# Patient Record
Sex: FEMALE | Race: WHITE | NOT HISPANIC OR LATINO | Employment: OTHER | URBAN - METROPOLITAN AREA
[De-identification: names, ages, dates, MRNs, and addresses within clinical notes are randomized per-mention and may not be internally consistent; named-entity substitution may affect disease eponyms.]

---

## 2017-07-24 ENCOUNTER — NON-PROVIDER VISIT (OUTPATIENT)
Dept: URGENT CARE | Facility: CLINIC | Age: 82
End: 2017-07-24

## 2017-07-24 DIAGNOSIS — Z11.1 ENCOUNTER FOR PPD TEST: ICD-10-CM

## 2017-07-24 PROCEDURE — 86580 TB INTRADERMAL TEST: CPT | Performed by: PHYSICIAN ASSISTANT

## 2017-07-27 ENCOUNTER — NON-PROVIDER VISIT (OUTPATIENT)
Dept: URGENT CARE | Facility: CLINIC | Age: 82
End: 2017-07-27

## 2017-07-27 DIAGNOSIS — Z11.1 PPD SCREENING TEST: ICD-10-CM

## 2017-07-27 LAB — TB WHEAL 3D P 5 TU DIAM: NORMAL MM

## 2017-12-28 ENCOUNTER — APPOINTMENT (RX ONLY)
Dept: URBAN - METROPOLITAN AREA CLINIC 20 | Facility: CLINIC | Age: 82
Setting detail: DERMATOLOGY
End: 2017-12-28

## 2017-12-28 DIAGNOSIS — L72.0 EPIDERMAL CYST: ICD-10-CM

## 2017-12-28 DIAGNOSIS — L82.1 OTHER SEBORRHEIC KERATOSIS: ICD-10-CM

## 2017-12-28 DIAGNOSIS — L81.4 OTHER MELANIN HYPERPIGMENTATION: ICD-10-CM

## 2017-12-28 DIAGNOSIS — L57.0 ACTINIC KERATOSIS: ICD-10-CM

## 2017-12-28 DIAGNOSIS — L85.3 XEROSIS CUTIS: ICD-10-CM

## 2017-12-28 PROBLEM — I10 ESSENTIAL (PRIMARY) HYPERTENSION: Status: ACTIVE | Noted: 2017-12-28

## 2017-12-28 PROCEDURE — ? LIQUID NITROGEN

## 2017-12-28 PROCEDURE — 17003 DESTRUCT PREMALG LES 2-14: CPT

## 2017-12-28 PROCEDURE — 99202 OFFICE O/P NEW SF 15 MIN: CPT | Mod: 25

## 2017-12-28 PROCEDURE — 17000 DESTRUCT PREMALG LESION: CPT

## 2017-12-28 PROCEDURE — ? COUNSELING

## 2017-12-28 ASSESSMENT — LOCATION SIMPLE DESCRIPTION DERM
LOCATION SIMPLE: INFERIOR FOREHEAD
LOCATION SIMPLE: NOSE
LOCATION SIMPLE: LEFT FOREHEAD
LOCATION SIMPLE: LEFT NOSE
LOCATION SIMPLE: LEFT CHEEK
LOCATION SIMPLE: RIGHT SUBMANDIBULAR AREA

## 2017-12-28 ASSESSMENT — LOCATION ZONE DERM
LOCATION ZONE: NOSE
LOCATION ZONE: FACE

## 2017-12-28 ASSESSMENT — LOCATION DETAILED DESCRIPTION DERM
LOCATION DETAILED: LEFT NASAL SIDEWALL
LOCATION DETAILED: LEFT MEDIAL MALAR CHEEK
LOCATION DETAILED: INFERIOR MID FOREHEAD
LOCATION DETAILED: NASAL DORSUM
LOCATION DETAILED: LEFT MEDIAL FOREHEAD
LOCATION DETAILED: RIGHT SUBMANDIBULAR AREA

## 2017-12-28 NOTE — PROCEDURE: LIQUID NITROGEN
Post-Care Instructions: I reviewed with the patient in detail post-care instructions. Patient is to wear sun protection and avoid picking at any of the treated lesions. Pt may apply Vaseline to crusted or scabbing areas.
Render Post-Care Instructions In Note?: yes
Duration Of Freeze Thaw-Cycle (Seconds): 0
Detail Level: Detailed
Consent: The patient's consent was obtained including but not limited to risks of crusting, scabbing, blistering, scarring, darker or lighter pigmentary change, recurrence, incomplete removal and infection.

## 2018-02-01 ENCOUNTER — APPOINTMENT (RX ONLY)
Dept: URBAN - METROPOLITAN AREA CLINIC 20 | Facility: CLINIC | Age: 83
Setting detail: DERMATOLOGY
End: 2018-02-01

## 2018-02-02 ENCOUNTER — APPOINTMENT (RX ONLY)
Dept: URBAN - METROPOLITAN AREA CLINIC 20 | Facility: CLINIC | Age: 83
Setting detail: DERMATOLOGY
End: 2018-02-02

## 2018-02-02 DIAGNOSIS — L57.0 ACTINIC KERATOSIS: ICD-10-CM | Status: IMPROVED

## 2018-02-02 DIAGNOSIS — L82.0 INFLAMED SEBORRHEIC KERATOSIS: ICD-10-CM

## 2018-02-02 DIAGNOSIS — L72.0 EPIDERMAL CYST: ICD-10-CM

## 2018-02-02 DIAGNOSIS — Z12.83 ENCOUNTER FOR SCREENING FOR MALIGNANT NEOPLASM OF SKIN: ICD-10-CM

## 2018-02-02 DIAGNOSIS — L81.4 OTHER MELANIN HYPERPIGMENTATION: ICD-10-CM

## 2018-02-02 DIAGNOSIS — Z85.828 PERSONAL HISTORY OF OTHER MALIGNANT NEOPLASM OF SKIN: ICD-10-CM

## 2018-02-02 DIAGNOSIS — L82.1 OTHER SEBORRHEIC KERATOSIS: ICD-10-CM

## 2018-02-02 DIAGNOSIS — D18.0 HEMANGIOMA: ICD-10-CM

## 2018-02-02 PROBLEM — D18.01 HEMANGIOMA OF SKIN AND SUBCUTANEOUS TISSUE: Status: ACTIVE | Noted: 2018-02-02

## 2018-02-02 PROCEDURE — 17003 DESTRUCT PREMALG LES 2-14: CPT

## 2018-02-02 PROCEDURE — 17110 DESTRUCTION B9 LES UP TO 14: CPT

## 2018-02-02 PROCEDURE — 17000 DESTRUCT PREMALG LESION: CPT | Mod: 59

## 2018-02-02 PROCEDURE — 99214 OFFICE O/P EST MOD 30 MIN: CPT | Mod: 25

## 2018-02-02 PROCEDURE — ? COUNSELING

## 2018-02-02 PROCEDURE — ? LIQUID NITROGEN

## 2018-02-02 ASSESSMENT — LOCATION SIMPLE DESCRIPTION DERM
LOCATION SIMPLE: LEFT CHEEK
LOCATION SIMPLE: CHEST
LOCATION SIMPLE: LEFT ANTERIOR NECK
LOCATION SIMPLE: RIGHT ZYGOMA
LOCATION SIMPLE: RIGHT EYEBROW
LOCATION SIMPLE: LEFT SCALP
LOCATION SIMPLE: RIGHT HAND

## 2018-02-02 ASSESSMENT — LOCATION DETAILED DESCRIPTION DERM
LOCATION DETAILED: UPPER STERNUM
LOCATION DETAILED: LEFT INFERIOR ANTERIOR NECK
LOCATION DETAILED: RIGHT ULNAR DORSAL HAND
LOCATION DETAILED: RIGHT CENTRAL ZYGOMA
LOCATION DETAILED: LEFT CLAVICULAR NECK
LOCATION DETAILED: RIGHT LATERAL EYEBROW
LOCATION DETAILED: LEFT INFERIOR LATERAL NECK
LOCATION DETAILED: LEFT CENTRAL FRONTAL SCALP
LOCATION DETAILED: LEFT MEDIAL MALAR CHEEK

## 2018-02-02 ASSESSMENT — LOCATION ZONE DERM
LOCATION ZONE: NECK
LOCATION ZONE: TRUNK
LOCATION ZONE: HAND
LOCATION ZONE: FACE
LOCATION ZONE: SCALP

## 2018-02-02 NOTE — PROCEDURE: LIQUID NITROGEN
Duration Of Freeze Thaw-Cycle (Seconds): 0
Detail Level: Detailed
Render Post-Care Instructions In Note?: yes
Post-Care Instructions: I reviewed with the patient in detail post-care instructions. Patient is to wear sun protection and avoid picking at any of the treated lesions. Pt may apply Vaseline to crusted or scabbing areas.
Consent: The patient's consent was obtained including but not limited to risks of crusting, scabbing, blistering, scarring, darker or lighter pigmentary change, recurrence, incomplete removal and infection.
Add 52 Modifier (Optional): no
Medical Necessity Information: It is in your best interest to select a reason for this procedure from the list below. All of these items fulfill various CMS LCD requirements except the new and changing color options.
Post-Care Instructions: I reviewed with the patient in detail post-care instructions. Patient is to avoid picking at any of the treated lesions. Pt may apply Vaseline to crusted or scabbing areas.
Number Of Freeze-Thaw Cycles: 3 freeze-thaw cycles
Medical Necessity Clause: This procedure was medically necessary because the lesions that were treated were: changing color

## 2018-07-11 ENCOUNTER — NON-PROVIDER VISIT (OUTPATIENT)
Dept: URGENT CARE | Facility: CLINIC | Age: 83
End: 2018-07-11

## 2018-07-11 DIAGNOSIS — Z11.1 ENCOUNTER FOR PPD TEST: ICD-10-CM

## 2018-07-11 PROCEDURE — 86580 TB INTRADERMAL TEST: CPT | Performed by: PHYSICIAN ASSISTANT

## 2018-07-11 NOTE — NON-PROVIDER
Felicity Dominguez is a 89 y.o. female here for a non-provider visit for PPD placement -- Step 1 of 1    Reason for PPD:  nursing home requirement    1. TB evaluation questionnaire completed by patient? Yes      -  If any answers marked yes did you contact a provider prior to placing? Not Indicated  2.  Patient notified to return to clinic for reading on: 7/13/18 to 7/14/18  3.  PPD Placement documentation completed on TB evaluation questionnaire? Yes  4.  Location of TB evaluation questionnaire filed:

## 2018-07-13 ENCOUNTER — NON-PROVIDER VISIT (OUTPATIENT)
Dept: URGENT CARE | Facility: CLINIC | Age: 83
End: 2018-07-13

## 2018-07-13 LAB — TB WHEAL 3D P 5 TU DIAM: NORMAL MM

## 2018-07-13 NOTE — NON-PROVIDER
Felicity Dominguez is a 89 y.o. female here for a non-provider visit for PPD reading -- Step 1 of 2.      1.  Resulted in Epic under enter/edit results? Yes   2.  TB evaluation questionnaire scanned into chart and original given to patient?Yes     3. Was induration greater than 0 mm? No.    If Step 1 of 2, when is patient returning for second step (delete if N/A):     Routed to PCP? No

## 2018-09-26 ENCOUNTER — APPOINTMENT (RX ONLY)
Dept: URBAN - METROPOLITAN AREA CLINIC 4 | Facility: CLINIC | Age: 83
Setting detail: DERMATOLOGY
End: 2018-09-26

## 2018-09-26 DIAGNOSIS — L57.0 ACTINIC KERATOSIS: ICD-10-CM

## 2018-09-26 DIAGNOSIS — L72.8 OTHER FOLLICULAR CYSTS OF THE SKIN AND SUBCUTANEOUS TISSUE: ICD-10-CM

## 2018-09-26 PROCEDURE — 17000 DESTRUCT PREMALG LESION: CPT

## 2018-09-26 PROCEDURE — ? LIQUID NITROGEN

## 2018-09-26 PROCEDURE — ? COUNSELING

## 2018-09-26 PROCEDURE — ? OBSERVATION AND MEASURE

## 2018-09-26 PROCEDURE — 99212 OFFICE O/P EST SF 10 MIN: CPT | Mod: 25

## 2018-09-26 PROCEDURE — ? ADDITIONAL NOTES

## 2018-09-26 ASSESSMENT — LOCATION SIMPLE DESCRIPTION DERM
LOCATION SIMPLE: RIGHT ANTERIOR NECK
LOCATION SIMPLE: LEFT CHEEK

## 2018-09-26 ASSESSMENT — LOCATION DETAILED DESCRIPTION DERM
LOCATION DETAILED: RIGHT INFERIOR ANTERIOR NECK
LOCATION DETAILED: LEFT CENTRAL MALAR CHEEK

## 2018-09-26 ASSESSMENT — LOCATION ZONE DERM
LOCATION ZONE: FACE
LOCATION ZONE: NECK

## 2018-11-01 ENCOUNTER — APPOINTMENT (RX ONLY)
Dept: URBAN - METROPOLITAN AREA CLINIC 4 | Facility: CLINIC | Age: 83
Setting detail: DERMATOLOGY
End: 2018-11-01

## 2019-02-02 ENCOUNTER — HOSPITAL ENCOUNTER (OUTPATIENT)
Facility: MEDICAL CENTER | Age: 84
End: 2019-02-05
Attending: EMERGENCY MEDICINE | Admitting: HOSPITALIST
Payer: MEDICARE

## 2019-02-02 ENCOUNTER — APPOINTMENT (OUTPATIENT)
Dept: RADIOLOGY | Facility: MEDICAL CENTER | Age: 84
End: 2019-02-02
Attending: EMERGENCY MEDICINE
Payer: MEDICARE

## 2019-02-02 DIAGNOSIS — N28.9 RENAL INSUFFICIENCY: ICD-10-CM

## 2019-02-02 DIAGNOSIS — Z95.0 HISTORY OF PACEMAKER: ICD-10-CM

## 2019-02-02 DIAGNOSIS — S42.291S OTHER CLOSED DISPLACED FRACTURE OF PROXIMAL END OF RIGHT HUMERUS, SEQUELA: ICD-10-CM

## 2019-02-02 DIAGNOSIS — I10 ESSENTIAL HYPERTENSION: ICD-10-CM

## 2019-02-02 PROBLEM — E03.9 ACQUIRED HYPOTHYROIDISM: Status: ACTIVE | Noted: 2019-02-02

## 2019-02-02 PROBLEM — S42.209A CLOSED FRACTURE OF PROXIMAL END OF HUMERUS: Status: ACTIVE | Noted: 2019-02-02

## 2019-02-02 LAB
ALBUMIN SERPL BCP-MCNC: 3.7 G/DL (ref 3.2–4.9)
ALBUMIN/GLOB SERPL: 1.2 G/DL
ALP SERPL-CCNC: 55 U/L (ref 30–99)
ALT SERPL-CCNC: 13 U/L (ref 2–50)
ANION GAP SERPL CALC-SCNC: 9 MMOL/L (ref 0–11.9)
APTT PPP: 25.8 SEC (ref 24.7–36)
AST SERPL-CCNC: 31 U/L (ref 12–45)
BASOPHILS # BLD AUTO: 0.2 % (ref 0–1.8)
BASOPHILS # BLD: 0.02 K/UL (ref 0–0.12)
BILIRUB SERPL-MCNC: 0.8 MG/DL (ref 0.1–1.5)
BUN SERPL-MCNC: 29 MG/DL (ref 8–22)
CALCIUM SERPL-MCNC: 9 MG/DL (ref 8.5–10.5)
CHLORIDE SERPL-SCNC: 109 MMOL/L (ref 96–112)
CO2 SERPL-SCNC: 23 MMOL/L (ref 20–33)
CREAT SERPL-MCNC: 1.22 MG/DL (ref 0.5–1.4)
EKG IMPRESSION: NORMAL
EOSINOPHIL # BLD AUTO: 0.09 K/UL (ref 0–0.51)
EOSINOPHIL NFR BLD: 0.9 % (ref 0–6.9)
ERYTHROCYTE [DISTWIDTH] IN BLOOD BY AUTOMATED COUNT: 43.8 FL (ref 35.9–50)
GLOBULIN SER CALC-MCNC: 3 G/DL (ref 1.9–3.5)
GLUCOSE SERPL-MCNC: 127 MG/DL (ref 65–99)
HCT VFR BLD AUTO: 39.4 % (ref 37–47)
HGB BLD-MCNC: 12.8 G/DL (ref 12–16)
IMM GRANULOCYTES # BLD AUTO: 0.04 K/UL (ref 0–0.11)
IMM GRANULOCYTES NFR BLD AUTO: 0.4 % (ref 0–0.9)
INR PPP: 1.09 (ref 0.87–1.13)
LYMPHOCYTES # BLD AUTO: 1.8 K/UL (ref 1–4.8)
LYMPHOCYTES NFR BLD: 17.1 % (ref 22–41)
MCH RBC QN AUTO: 30.1 PG (ref 27–33)
MCHC RBC AUTO-ENTMCNC: 32.5 G/DL (ref 33.6–35)
MCV RBC AUTO: 92.7 FL (ref 81.4–97.8)
MONOCYTES # BLD AUTO: 0.46 K/UL (ref 0–0.85)
MONOCYTES NFR BLD AUTO: 4.4 % (ref 0–13.4)
NEUTROPHILS # BLD AUTO: 8.12 K/UL (ref 2–7.15)
NEUTROPHILS NFR BLD: 77 % (ref 44–72)
NRBC # BLD AUTO: 0 K/UL
NRBC BLD-RTO: 0 /100 WBC
PLATELET # BLD AUTO: 173 K/UL (ref 164–446)
PMV BLD AUTO: 10.9 FL (ref 9–12.9)
POTASSIUM SERPL-SCNC: 4.1 MMOL/L (ref 3.6–5.5)
PROT SERPL-MCNC: 6.7 G/DL (ref 6–8.2)
PROTHROMBIN TIME: 14.2 SEC (ref 12–14.6)
RBC # BLD AUTO: 4.25 M/UL (ref 4.2–5.4)
SODIUM SERPL-SCNC: 141 MMOL/L (ref 135–145)
WBC # BLD AUTO: 10.5 K/UL (ref 4.8–10.8)

## 2019-02-02 PROCEDURE — 700102 HCHG RX REV CODE 250 W/ 637 OVERRIDE(OP): Performed by: HOSPITALIST

## 2019-02-02 PROCEDURE — 96374 THER/PROPH/DIAG INJ IV PUSH: CPT

## 2019-02-02 PROCEDURE — 99220 PR INITIAL OBSERVATION CARE,LEVL III: CPT | Performed by: HOSPITALIST

## 2019-02-02 PROCEDURE — 73030 X-RAY EXAM OF SHOULDER: CPT | Mod: RT

## 2019-02-02 PROCEDURE — G0378 HOSPITAL OBSERVATION PER HR: HCPCS

## 2019-02-02 PROCEDURE — 304561 HCHG STAT O2

## 2019-02-02 PROCEDURE — 93005 ELECTROCARDIOGRAM TRACING: CPT | Performed by: EMERGENCY MEDICINE

## 2019-02-02 PROCEDURE — 85025 COMPLETE CBC W/AUTO DIFF WBC: CPT

## 2019-02-02 PROCEDURE — A9270 NON-COVERED ITEM OR SERVICE: HCPCS | Performed by: HOSPITALIST

## 2019-02-02 PROCEDURE — 71045 X-RAY EXAM CHEST 1 VIEW: CPT

## 2019-02-02 PROCEDURE — 85730 THROMBOPLASTIN TIME PARTIAL: CPT

## 2019-02-02 PROCEDURE — 302875 HCHG BANDAGE ACE 4 OR 6""

## 2019-02-02 PROCEDURE — 80053 COMPREHEN METABOLIC PANEL: CPT

## 2019-02-02 PROCEDURE — 99285 EMERGENCY DEPT VISIT HI MDM: CPT

## 2019-02-02 PROCEDURE — 85610 PROTHROMBIN TIME: CPT

## 2019-02-02 PROCEDURE — 700111 HCHG RX REV CODE 636 W/ 250 OVERRIDE (IP): Performed by: EMERGENCY MEDICINE

## 2019-02-02 PROCEDURE — 36415 COLL VENOUS BLD VENIPUNCTURE: CPT

## 2019-02-02 RX ORDER — POTASSIUM CHLORIDE 1500 MG/1
20 TABLET, EXTENDED RELEASE ORAL DAILY
COMMUNITY

## 2019-02-02 RX ORDER — MORPHINE SULFATE 4 MG/ML
4 INJECTION, SOLUTION INTRAMUSCULAR; INTRAVENOUS ONCE
Status: COMPLETED | OUTPATIENT
Start: 2019-02-02 | End: 2019-02-02

## 2019-02-02 RX ORDER — LEVOTHYROXINE SODIUM 0.03 MG/1
50 TABLET ORAL
Status: DISCONTINUED | OUTPATIENT
Start: 2019-02-03 | End: 2019-02-05 | Stop reason: HOSPADM

## 2019-02-02 RX ORDER — ACETAMINOPHEN 325 MG/1
650 TABLET ORAL EVERY 6 HOURS PRN
Status: DISCONTINUED | OUTPATIENT
Start: 2019-02-02 | End: 2019-02-05 | Stop reason: HOSPADM

## 2019-02-02 RX ORDER — MORPHINE SULFATE 4 MG/ML
1-2 INJECTION, SOLUTION INTRAMUSCULAR; INTRAVENOUS
Status: DISCONTINUED | OUTPATIENT
Start: 2019-02-02 | End: 2019-02-05 | Stop reason: HOSPADM

## 2019-02-02 RX ORDER — POLYETHYLENE GLYCOL 3350 17 G/17G
1 POWDER, FOR SOLUTION ORAL
Status: DISCONTINUED | OUTPATIENT
Start: 2019-02-02 | End: 2019-02-05 | Stop reason: HOSPADM

## 2019-02-02 RX ORDER — BISACODYL 10 MG
10 SUPPOSITORY, RECTAL RECTAL
Status: DISCONTINUED | OUTPATIENT
Start: 2019-02-02 | End: 2019-02-05 | Stop reason: HOSPADM

## 2019-02-02 RX ORDER — OXYCODONE HYDROCHLORIDE 5 MG/1
5 TABLET ORAL EVERY 4 HOURS PRN
Status: DISCONTINUED | OUTPATIENT
Start: 2019-02-02 | End: 2019-02-05 | Stop reason: HOSPADM

## 2019-02-02 RX ORDER — ALENDRONATE SODIUM 70 MG/1
70 TABLET ORAL
COMMUNITY

## 2019-02-02 RX ORDER — LEVOTHYROXINE SODIUM 0.05 MG/1
50 TABLET ORAL
COMMUNITY

## 2019-02-02 RX ORDER — AMOXICILLIN 250 MG
2 CAPSULE ORAL 2 TIMES DAILY
Status: DISCONTINUED | OUTPATIENT
Start: 2019-02-02 | End: 2019-02-05 | Stop reason: HOSPADM

## 2019-02-02 RX ORDER — HEPARIN SODIUM 5000 [USP'U]/ML
5000 INJECTION, SOLUTION INTRAVENOUS; SUBCUTANEOUS EVERY 8 HOURS
Status: DISCONTINUED | OUTPATIENT
Start: 2019-02-02 | End: 2019-02-05 | Stop reason: HOSPADM

## 2019-02-02 RX ORDER — ATENOLOL 50 MG/1
25 TABLET ORAL DAILY
Status: DISCONTINUED | OUTPATIENT
Start: 2019-02-03 | End: 2019-02-05 | Stop reason: HOSPADM

## 2019-02-02 RX ORDER — ATENOLOL 25 MG/1
25 TABLET ORAL DAILY
COMMUNITY

## 2019-02-02 RX ORDER — FUROSEMIDE 20 MG/1
20 TABLET ORAL DAILY
COMMUNITY

## 2019-02-02 RX ADMIN — SENNOSIDES AND DOCUSATE SODIUM 2 TABLET: 8.6; 5 TABLET ORAL at 17:07

## 2019-02-02 RX ADMIN — MORPHINE SULFATE 4 MG: 4 INJECTION INTRAVENOUS at 11:59

## 2019-02-02 RX ADMIN — ACETAMINOPHEN 650 MG: 325 TABLET, FILM COATED ORAL at 15:49

## 2019-02-02 ASSESSMENT — COGNITIVE AND FUNCTIONAL STATUS - GENERAL
WALKING IN HOSPITAL ROOM: A LITTLE
EATING MEALS: A LITTLE
DRESSING REGULAR UPPER BODY CLOTHING: A LOT
SUGGESTED CMS G CODE MODIFIER MOBILITY: CK
MOBILITY SCORE: 16
DRESSING REGULAR LOWER BODY CLOTHING: A LOT
DAILY ACTIVITIY SCORE: 14
TOILETING: A LOT
CLIMB 3 TO 5 STEPS WITH RAILING: A LOT
MOVING FROM LYING ON BACK TO SITTING ON SIDE OF FLAT BED: A LITTLE
TURNING FROM BACK TO SIDE WHILE IN FLAT BAD: A LITTLE
HELP NEEDED FOR BATHING: A LOT
SUGGESTED CMS G CODE MODIFIER DAILY ACTIVITY: CK
STANDING UP FROM CHAIR USING ARMS: A LITTLE
PERSONAL GROOMING: A LITTLE
MOVING TO AND FROM BED TO CHAIR: A LOT

## 2019-02-02 ASSESSMENT — LIFESTYLE VARIABLES
AVERAGE NUMBER OF DAYS PER WEEK YOU HAVE A DRINK CONTAINING ALCOHOL: 0
TOTAL SCORE: 0
EVER HAD A DRINK FIRST THING IN THE MORNING TO STEADY YOUR NERVES TO GET RID OF A HANGOVER: NO
TOTAL SCORE: 0
HAVE YOU EVER FELT YOU SHOULD CUT DOWN ON YOUR DRINKING: NO
ON A TYPICAL DAY WHEN YOU DRINK ALCOHOL HOW MANY DRINKS DO YOU HAVE: 1
ALCOHOL_USE: YES
HOW MANY TIMES IN THE PAST YEAR HAVE YOU HAD 5 OR MORE DRINKS IN A DAY: 0
CONSUMPTION TOTAL: NEGATIVE
HAVE PEOPLE ANNOYED YOU BY CRITICIZING YOUR DRINKING: NO
EVER FELT BAD OR GUILTY ABOUT YOUR DRINKING: NO
TOTAL SCORE: 0
EVER_SMOKED: NEVER

## 2019-02-02 ASSESSMENT — ENCOUNTER SYMPTOMS
FEVER: 0
FALLS: 1
CHILLS: 0
SHORTNESS OF BREATH: 0

## 2019-02-02 ASSESSMENT — COPD QUESTIONNAIRES
COPD SCREENING SCORE: 2
HAVE YOU SMOKED AT LEAST 100 CIGARETTES IN YOUR ENTIRE LIFE: NO/DON'T KNOW
DURING THE PAST 4 WEEKS HOW MUCH DID YOU FEEL SHORT OF BREATH: NONE/LITTLE OF THE TIME
DO YOU EVER COUGH UP ANY MUCUS OR PHLEGM?: NO/ONLY WITH OCCASIONAL COLDS OR INFECTIONS
IN THE PAST 12 MONTHS DO YOU DO LESS THAN YOU USED TO BECAUSE OF YOUR BREATHING PROBLEMS: DISAGREE/UNSURE

## 2019-02-02 ASSESSMENT — PATIENT HEALTH QUESTIONNAIRE - PHQ9
2. FEELING DOWN, DEPRESSED, IRRITABLE, OR HOPELESS: NOT AT ALL
1. LITTLE INTEREST OR PLEASURE IN DOING THINGS: NOT AT ALL
SUM OF ALL RESPONSES TO PHQ9 QUESTIONS 1 AND 2: 0

## 2019-02-02 NOTE — ED TRIAGE NOTES
".  Chief Complaint   Patient presents with   • T-5000 GLF     Sli[place] and fell [place] movie theatre, denies hitting head, not on blood thinners, (-) LOC   • Shoulder Pain     Right shoulder pain, no obvious deformity, CMS intact     ./69   Pulse 71   Temp 35.8 °C (96.5 °F) (Oral)   Resp 16   Ht 1.575 m (5' 2\")   Wt 75.4 kg (166 lb 3.6 oz)   BMI 30.40 kg/m²     BIB EMS with above complaints, 50 mcg Fentanyl given PTA, chart up for ERP.  "

## 2019-02-02 NOTE — ED NOTES
Med rec complete per MAR-[facility]  Allergies have been verified per MAR  No oral ABX within the last 30 days

## 2019-02-02 NOTE — ED PROVIDER NOTES
ED Provider Note    Scribed for Shiva Sherman M.D. by Teresa Peres. 2/2/2019, 10:54 AM.    Primary care provider: Pcp Pt States None  Means of arrival: EMS  History obtained from: friend  History limited by: none     CHIEF COMPLAINT  Chief Complaint   Patient presents with   • T-5000 GLF     Slipped and fell [place] movie theatre, denies hitting head, not on blood thinners, (-) LOC   • Shoulder Pain     Right shoulder pain, no obvious deformity, CMS intact       HPI  Felicity Dominguez is a 89 y.o. female who presents to the Emergency Department for evaluation of right shoulder pain secondary to a ground level mechanical fall earlier today. She states that she tripped over a wet rug at the movie theaters and fell with both of her arms extended out in front of her. She denies any lightheadedness, palpitations, chest pain, or shortness of breath prior to the fall. She additionally denies hitting her head or losing consciousness secondary to the fall.  Patient has not had any other symptoms recently such as fever, chills, cough, nausea, vomiting, diarrhea, constipation, dysuria. Patient received 50 mcg fentanyl en route to the ED for pain. She is not normally on supplemental oxygen but is currently requiring oxygen likely secondary to the fentanyl. She has a history of urinary incontinence and wears adult diapers. Per family, she is currently living at a senior living center and is generally healthy. She normally uses a walker to ambulate. She had a pacemaker placed last year at [facility]. Family additionally reports that she has a history of frequent falls a few years ago prior to having a left hip replacement surgery. She is currently taking iron supplements and water pills with potassium replacement at this time.     REVIEW OF SYSTEMS  Pertinent positives include right shoulder pain. Pertinent negatives include no lightheadedness, palpitations, chest pain, shortness of breath, loss of consciousness, fever, chills,  "cough, nausea, vomiting, diarrhea, constipation, dysuria. As above, all other systems reviewed and are negative.   See HPI for further details.     PAST MEDICAL HISTORY   has a past medical history of Arthritis; Hypertension; Incontinence; and Osteopenia.    SURGICAL HISTORY   has a past surgical history that includes inguinal hernia repair; abdominal hysterectomy total; appendectomy; cholecystectomy; hip arthroplasty total (Left); other cardiac surgery; and pacemaker insertion.cholecystectomy, tonsillectomy, left hip replacement    SOCIAL HISTORY  Social History   Substance Use Topics   • Smoking status: Never Smoker   •     • Alcohol use Yes      Comment: occasionally      History   Drug Use No       FAMILY HISTORY  History reviewed. No pertinent family history.    CURRENT MEDICATIONS  Home Medications     Reviewed by Jay Acuna R.N. (Registered Nurse) on 02/02/19 at 1520  Med List Status: Complete   Medication Last Dose Status   alendronate (FOSAMAX) 70 MG Tab unk Active   atenolol (TENORMIN) 25 MG Tab 2/2/2019 Active   furosemide (LASIX) 20 MG Tab 2/2/2019 Active   levothyroxine (SYNTHROID) 50 MCG Tab 2/2/2019 Active   potassium Chloride ER (K-TAB) 20 MEQ Tab CR tablet 2/2/2019 Active                ALLERGIES  Allergies   Allergen Reactions   • Fluoride Hives and Rash     Gets mouth sores uses flouride free toothpastes.        PHYSICAL EXAM  VITAL SIGNS: /69   Pulse 71   Temp 35.8 °C (96.5 °F) (Oral)   Resp 16   Ht 1.575 m (5' 2\")   Wt 75.4 kg (166 lb 3.6 oz)   BMI 30.40 kg/m²   Vitals reviewed.  Constitutional: Alert in no apparent distress.  HENT: No signs of trauma, Bilateral external ears normal, Nose normal.   Eyes: Pupils are equal and reactive, Conjunctiva normal, Non-icteric.   Neck: Normal range of motion, No tenderness, Supple, No stridor.   Lymphatic: No lymphadenopathy noted.   Cardiovascular: Regular rate and rhythm, no murmurs.   Thorax & Lungs: Normal breath sounds, No respiratory " distress, No wheezing, No chest tenderness. Patient requires supplemental oxygen in the room which is likely secondary to fentanyl  Abdomen: Bowel sounds normal, Soft, No tenderness, No peritoneal signs, No masses, No pulsatile masses.   Skin: Warm, Dry, No erythema, No rash.   Extremities: Intact distal pulses, No edema, No tenderness, No cyanosis  Musculoskeletal: Good range of motion in all major joints. No major deformities noted. Right shoulder tenderness and pain with range of motion  Neurologic: Alert , Normal motor function, Normal sensory function, No focal deficits noted.   Psychiatric: Affect normal, Judgment normal, Mood normal.     DIAGNOSTIC STUDIES / PROCEDURES    LABS  Labs Reviewed   CBC WITH DIFFERENTIAL - Abnormal; Notable for the following:        Result Value    MCHC 32.5 (*)     Neutrophils-Polys 77.00 (*)     Lymphocytes 17.10 (*)     Neutrophils (Absolute) 8.12 (*)     All other components within normal limits    Narrative:     Indicate which anticoagulants the patient is on:->UNKNOWN   COMP METABOLIC PANEL - Abnormal; Notable for the following:     Glucose 127 (*)     Bun 29 (*)     All other components within normal limits    Narrative:     Indicate which anticoagulants the patient is on:->UNKNOWN   ESTIMATED GFR - Abnormal; Notable for the following:     GFR If  50 (*)     GFR If Non  41 (*)     All other components within normal limits    Narrative:     Indicate which anticoagulants the patient is on:->UNKNOWN   PROTHROMBIN TIME    Narrative:     Indicate which anticoagulants the patient is on:->UNKNOWN   APTT    Narrative:     Indicate which anticoagulants the patient is on:->UNKNOWN   URINALYSIS,CULTURE IF INDICATED      All labs reviewed by me.    EKG  Interpreted by me  Rhythm:  Normal sinus rhythm   Rate: 72  Axis: normal  Intervals: prolonged , prolonged , prolonged QTc 500  Nonspecific ST and T wave changes  No old EKG for  comparison  Clinical Impression: First degree AV block, RBBB, does not indicate ischemia or arrhythmia at this time    RADIOLOGY  DX-CHEST-PORTABLE (1 VIEW)   Final Result      1.  No acute cardiac or pulmonary abnormality is noted.      2.  Right humeral head and neck fracture again noted.      DX-SHOULDER 2+ RIGHT   Final Result      1.  Acute comminuted fracture of the right humeral head and neck.      2.  No dislocation.        The radiologist's interpretation of all radiological studies have been reviewed by me.    COURSE & MEDICAL DECISION MAKING  Nursing notes, VS, PMSFHx reviewed in chart.  Differential diagnoses include but not limited to: shoulder fracture, shoulder dislocation, anemia, UTI, arrhythmia, electrolyte abnormality.       10:54 AM Patient seen and examined at bedside. Ordered for Dx-shoulder to evaluate. Patient does not want pain medication at this time as she already receiving 50 mcg fentanyl en route to the ED. Discussed plan of care with patient and friend which includes getting an x-ray of her shoulder with no further workup as her fall was secondary to tripping and was not related to dizziness, chest pain, or syncope.      11:55 PM Patient is complaining of pain and will be treated with 4 mg morphine at this time.     12:08 PM Patient reevaluated at bedside. She is resting comfortably in bed at this time. Reviewed patient's X-ray with the family. Informed patient and family that she has a humeral head fracture at this time. Explained that she most likely will not need surgery. Discussed further plan of care which includes possible admission if she is unable to be cared for at the senior living facility. Family understands and agrees to plan of care.  will speak with the family about rehabilitation options.     12:29 PM  is at bedside speaking with family.     12:38 PM Spoke with Social Work who agrees with plan for admission as she is unable to be cared for at her  current facility.     12:41 PM Spoke to Dr. Villegas (hospitalist) who agrees to admit the patient. He wants CBC with differential, CMP, EKG, PTT, APTT, Dx-chest, urinalysis to be ordered at this time.     12:44 PM Paged Orthopedics.     1:30 PM Spoke to Dr. Emanuel (orthopedics) who agrees to consult the patient.         DISPOSITION:  Patient will be admitted to Dr. Villegas (hospitalist) in stable condition.    FINAL IMPRESSION  Acute right proximal humerus fracture, comminuted closed     Teresa PEREZ (Scribe), am scribing for, and in the presence of, Shiva Sherman M.D..    Electronically signed by: Teresa Peres (Scribmarisol), 2/2/2019    Shiva PEREZ M.D. personally performed the services described in this documentation, as scribed by Teresa Peres in my presence, and it is both accurate and complete. C    The note accurately reflects work and decisions made by me.  Shiva Sherman  2/2/2019  4:21 PM

## 2019-02-02 NOTE — ED NOTES
Assist RN: skin tear noted to L forearm; irrigated with wet saline placed; pt requesting pain medication; ERP notified.

## 2019-02-02 NOTE — DISCHARGE PLANNING
Care Transition Team Assessment    MSW met with pt, pt's granddaughter Elke Nava ([phone]) and [facility] Director Cyndi Martin ([phone]). [place] and now has a humerus fracture. Pt lives on the independent side of [facility] [facility]  Pt's NOK and daughter Sandra Nava ([phone]) who is in [place] till April. Elke is helping with pt during that time. Elke is concerned that pt does not have enough support at [facility] while she heals from her fracture. Cyndi with [facility] stated their nurse that could potentially help is not back till Monday. Pt doesn't have any other help at this time. Elke can help on and off but not 24/7. Both Elke and Cyndi are concerned that pt will fall again. Pt uses a walker and can independely do ADLs before the fall.     MSW went over options with granddaughter Elke. Pt may qualify for SNF but if not she will need to come up with other options such as home health and caregivers. Elke stated money shouldn't be an issue. Pt was at [facility] last year after her pacemaker and said it was a horrible place. Pt's PCP is Denver Miller and she see's a Cardiologist with the [facility]. Pharmacy is [facility].     MSW provided pt's granddaughter with caregiver list, as well as SNF/HH choice form. Pt's granddaughter to come up with plan in the next few days and check in on pt's status. MSW will update unit SW on case.     Information Source  Orientation : Oriented x 4  Information Given By: Patient, Relative  Informant's Name: Emilie Nava  Who is responsible for making decisions for patient? : Patient    Readmission Evaluation  Is this a readmission?: No    Elopement Risk  Legal Hold: No  Ambulatory or Self Mobile in Wheelchair: No-Not an Elopement Risk    Interdisciplinary Discharge Planning  Does Admitting Nurse Feel This Could be a Complex Discharge?: No  Primary Care Physician:  (Denver Miller)  Lives with - Patient's  Self Care Capacity: Alone and Able to Care For Self  Patient or legal guardian wants to designate a caregiver (see row info): No  Support Systems: Children, Family Member(s), Friends / Neighbors  Housing / Facility: Assisted Living Residence  Name of Care Facility: [facility]  Do You Take your Prescribed Medications Regularly: Yes  Able to Return to Previous ADL's: Future Time w/Therapy  Mobility Issues: Yes  Prior Services: None, Home-Independent  Patient Expects to be Discharged to::  (SNF or Home with caregivers and home health)  Assistance Needed: Unknown at this Time  Durable Medical Equipment: Walker    Discharge Preparedness  What is your plan after discharge?: Home with help, Skilled nursing facility, Home health care  What are your discharge supports?: Child  Prior Functional Level: Independent with Activities of Daily Living  Difficulity with ADLs: None  Difficulity with IADLs: None    Functional Assesment  Prior Functional Level: Independent with Activities of Daily Living    Finances  Financial Barriers to Discharge: No  Prescription Coverage: Yes         Values / Beliefs / Concerns  Values / Beliefs Concerns : No    Advance Directive  Advance Directive?: DPOA for Health Care  Durable Power of  Name and Contact :  (none listed in paperwork)    Domestic Abuse  Have you ever been the victim of abuse or violence?: No    Psychological Assessment  History of Substance Abuse: None  History of Psychiatric Problems: No    Discharge Risks or Barriers  Discharge risks or barriers?: Post-acute placement / services    Anticipated Discharge Information  Anticipated discharge disposition: Assisted living, [facility], SNF  Discharge Address: [address] [place] [address] [place] [address]  Discharge Contact Phone Number: [phone]

## 2019-02-02 NOTE — ED NOTES
2nd Attempt to given report to Ortho RN.  Report given to Ortho charge.  Patient transported via gurney with transport.     Received report from day RN. Assumed pt care. Discussed call light/phone system, communication board and POC. Pt is aao x to 2, disoriented to time and event. Pt is cooperative and able to follow commands. Pt has a moist cough today, VSS. Pt denies pain. Pt is pending placement/guardianship. Pt is incontinent, RN and CNA perform bed bath and full bed change. Pt mobility assessed. Pt is a one assist up to the chair. Bed alarm on. Fall precautions in place. Bed is locked and in low position, call light within reach. Treaded slippers in place. Pt needs met at this time. Hourly rounding in place.

## 2019-02-03 PROBLEM — N28.9 RENAL INSUFFICIENCY: Status: ACTIVE | Noted: 2019-02-03

## 2019-02-03 PROCEDURE — 96374 THER/PROPH/DIAG INJ IV PUSH: CPT

## 2019-02-03 PROCEDURE — 97162 PT EVAL MOD COMPLEX 30 MIN: CPT

## 2019-02-03 PROCEDURE — 96372 THER/PROPH/DIAG INJ SC/IM: CPT

## 2019-02-03 PROCEDURE — G0378 HOSPITAL OBSERVATION PER HR: HCPCS

## 2019-02-03 PROCEDURE — 700111 HCHG RX REV CODE 636 W/ 250 OVERRIDE (IP): Performed by: HOSPITALIST

## 2019-02-03 PROCEDURE — 700102 HCHG RX REV CODE 250 W/ 637 OVERRIDE(OP): Performed by: HOSPITALIST

## 2019-02-03 PROCEDURE — 99225 PR SUBSEQUENT OBSERVATION CARE,LEVEL II: CPT | Performed by: HOSPITALIST

## 2019-02-03 PROCEDURE — A9270 NON-COVERED ITEM OR SERVICE: HCPCS | Performed by: HOSPITALIST

## 2019-02-03 RX ORDER — HYDRALAZINE HYDROCHLORIDE 20 MG/ML
10 INJECTION INTRAMUSCULAR; INTRAVENOUS EVERY 6 HOURS PRN
Status: DISCONTINUED | OUTPATIENT
Start: 2019-02-03 | End: 2019-02-05 | Stop reason: HOSPADM

## 2019-02-03 RX ADMIN — HEPARIN SODIUM 5000 UNITS: 5000 INJECTION, SOLUTION INTRAVENOUS; SUBCUTANEOUS at 21:41

## 2019-02-03 RX ADMIN — ATENOLOL 25 MG: 50 TABLET ORAL at 06:29

## 2019-02-03 RX ADMIN — HEPARIN SODIUM 5000 UNITS: 5000 INJECTION, SOLUTION INTRAVENOUS; SUBCUTANEOUS at 14:04

## 2019-02-03 RX ADMIN — OXYCODONE HYDROCHLORIDE 5 MG: 5 TABLET ORAL at 14:05

## 2019-02-03 RX ADMIN — SENNOSIDES AND DOCUSATE SODIUM 2 TABLET: 8.6; 5 TABLET ORAL at 06:28

## 2019-02-03 RX ADMIN — OXYCODONE HYDROCHLORIDE 5 MG: 5 TABLET ORAL at 04:19

## 2019-02-03 RX ADMIN — OXYCODONE HYDROCHLORIDE 5 MG: 5 TABLET ORAL at 08:36

## 2019-02-03 RX ADMIN — HEPARIN SODIUM 5000 UNITS: 5000 INJECTION, SOLUTION INTRAVENOUS; SUBCUTANEOUS at 06:29

## 2019-02-03 RX ADMIN — SENNOSIDES AND DOCUSATE SODIUM 2 TABLET: 8.6; 5 TABLET ORAL at 17:25

## 2019-02-03 RX ADMIN — LEVOTHYROXINE SODIUM 50 MCG: 25 TABLET ORAL at 06:29

## 2019-02-03 ASSESSMENT — ENCOUNTER SYMPTOMS
SHORTNESS OF BREATH: 0
FALLS: 1
BACK PAIN: 0
COUGH: 0
DIARRHEA: 0
SENSORY CHANGE: 0
FLANK PAIN: 0
STRIDOR: 0
NECK PAIN: 0
ABDOMINAL PAIN: 0
WEAKNESS: 1
FEVER: 0
VOMITING: 0
WHEEZING: 0
CHILLS: 0
SPEECH CHANGE: 0
MYALGIAS: 1

## 2019-02-03 ASSESSMENT — COGNITIVE AND FUNCTIONAL STATUS - GENERAL
TURNING FROM BACK TO SIDE WHILE IN FLAT BAD: UNABLE
MOBILITY SCORE: 8
MOVING TO AND FROM BED TO CHAIR: UNABLE
STANDING UP FROM CHAIR USING ARMS: A LOT
MOVING FROM LYING ON BACK TO SITTING ON SIDE OF FLAT BED: UNABLE
CLIMB 3 TO 5 STEPS WITH RAILING: TOTAL
WALKING IN HOSPITAL ROOM: A LOT
SUGGESTED CMS G CODE MODIFIER MOBILITY: CM

## 2019-02-03 ASSESSMENT — GAIT ASSESSMENTS: GAIT LEVEL OF ASSIST: REFUSED

## 2019-02-03 NOTE — CARE PLAN
Problem: Communication  Goal: The ability to communicate needs accurately and effectively will improve  Outcome: PROGRESSING AS EXPECTED  Pt does call nursing staff appropriately with her call light and communicates her needs to the nursing staff.     Problem: Safety  Goal: Will remain free from falls  Outcome: PROGRESSING AS EXPECTED  Pt is a high fall risk per christiano sanchez. Fall percautions are in place: bed alar, non-slip socks, DME put away, and pt has her call light near her.

## 2019-02-03 NOTE — THERAPY
"Physical Therapy Evaluation completed.   Bed Mobility:  Supine to Sit: Moderate Assist  Transfers: Sit to Stand: Moderate Assist  Gait: Level Of Assist: Refused        Plan of Care: Will benefit from Physical Therapy 3 times per week  Discharge Recommendations: Equipment: Will Continue to Assess for Equipment Needs. Post-acute therapy Discharge to a transitional care facility for continued skilled therapy services.    See \"Rehab Therapy-Acute\" Patient Summary Report for complete documentation.       Pt is a 89 y/o female with a right proximal humeral fracture following a GLF. Pt is non operative and is NWB at [facility] with sling in place except for bathing/hygiene as per ortho. Pt resistant to mobility in general, was able to tolerate EOB seated activity but despite several times educating her on her WB status and inability to use a walker because of this, Pt continued to demand that she only use her 4WW.  Pt able to perform 1 sit to stand with mod assist, she was unable to perform any weight shifting or take any steps at this time. Pt resides in an assisted living facility and it is unclear how much assistance they are willing/able to provide her there. At this time, Pt may have to be WC level and work on transfers as she refuses any other AD besides her 4WW. Recommend post acute transitional care at [place] for continued therapy.  "

## 2019-02-03 NOTE — PROGRESS NOTES
[facility] Medicine Daily Progress Note    Date of Service  2/3/2019    Chief Complaint  89 y.o. female admitted 2/2/2019 post fall with right shoulder pain.    Hospital Course    Patient with history of pacemaker, hypertension, hypothyroidism, chronic debility with walker dependence,  currently resides at [facility] living who was admitted after she fell on her right shoulder.  X-ray in the ER revealed proximal right humerus fracture.  Dr. Emanuel orthopedics consulted and is recommended nonoperative treatment with nonweightbearing activities and sling.    Interval Problem Update     Patient mobilized this morning .  Noted unsteady when up with assist .  She was rigid and trying to use right arm with walker.  Reports right shoulder pain controlled.  At times hypertensive systolic blood pressure 170s.    Consultants/Specialty  Orthopedics Dr. Emanuel    Code Status  Full    Disposition  Plan PT/OT    Review of Systems  Review of Systems   Constitutional: Negative for chills and fever.   HENT: Negative for congestion.    Respiratory: Negative for cough, shortness of breath, wheezing and stridor.    Cardiovascular: Negative for chest pain and leg swelling.   Gastrointestinal: Negative for abdominal pain, diarrhea and vomiting.   Genitourinary: Negative for flank pain and hematuria.   Musculoskeletal: Positive for falls, joint pain and myalgias. Negative for back pain and neck pain.   Neurological: Positive for weakness. Negative for sensory change and speech change.        Physical Exam  Temp:  [35.8 °C (96.5 °F)-36.6 °C (97.9 °F)] 36.3 °C (97.4 °F)  Pulse:  [60-78] 60  Resp:  [16-20] 17  BP: (109-171)/(62-91) 170/79  SpO2:  [92 %-98 %] 96 %    Physical Exam   Constitutional: She is oriented to person, place, and time. No distress.   HENT:   Head: Normocephalic and atraumatic.   Eyes: EOM are normal. Right eye exhibits no discharge. Left eye exhibits no discharge. No scleral icterus.   Neck: Neck supple.    Cardiovascular: Normal rate and regular rhythm.    No murmur heard.  Pulmonary/Chest: Effort normal. No stridor. She has no wheezes. She has no rales.   Abdominal: Soft. Bowel sounds are normal. She exhibits no distension. There is no tenderness.   Musculoskeletal: She exhibits edema (1+ lower extremity edema) and tenderness.   Right arm wrapped and in sling.   Neurological: She is alert and oriented to person, place, and time. No cranial nerve deficit.   Skin: Skin is warm and dry. She is not diaphoretic. No pallor.   Vitals reviewed.      Fluids    Intake/Output Summary (Last 24 hours) at 02/03/19 0817  Last data filed at 02/03/19 0400   Gross per 24 hour   Intake              440 ml   Output                0 ml   Net              440 ml       Laboratory  Recent Labs      02/02/19   1309   WBC  10.5   RBC  4.25   HEMOGLOBIN  12.8   HEMATOCRIT  39.4   MCV  92.7   MCH  30.1   MCHC  32.5*   RDW  43.8   PLATELETCT  173   MPV  10.9     Recent Labs      02/02/19   1309   SODIUM  141   POTASSIUM  4.1   CHLORIDE  109   CO2  23   GLUCOSE  127*   BUN  29*   CREATININE  1.22   CALCIUM  9.0     Recent Labs      02/02/19   1309   APTT  25.8   INR  1.09               Imaging  DX-CHEST-PORTABLE (1 VIEW)   Final Result      1.  No acute cardiac or pulmonary abnormality is noted.      2.  Right humeral head and neck fracture again noted.      DX-SHOULDER 2+ RIGHT   Final Result      1.  Acute comminuted fracture of the right humeral head and neck.      2.  No dislocation.           Assessment/Plan  * Closed fracture of proximal end of humerus- (present on admission)   Assessment & Plan    Secondary to ground-level fall.  Consulted by Dr. Emanuel, orthopedics, and he recommends this is nonoperative and nonweightbearing and that she should follow-up with a renal orthopedic clinic in 2 weeks for repeat x-rays.  Chronic debility uses walker at home.  Upon mobilization this morning she has been stiff, unsteady with ability to use her  fractured arm.  Pain control with as needed Tylenol, oxycodone, IV morphine  Additional PT/OT  Reassess function and try to determine if appropriate for transfer back to assisted living.  Discussed with   Recommend for follow-up [facility] clinic with x-rays in 2 weeks.          Acquired hypothyroidism- (present on admission)   Assessment & Plan    Continue Synthroid 50 mcg daily     HTN (hypertension)- (present on admission)   Assessment & Plan    Mild to moderate ranges   continue home atenolol 25 mg daily with holding parameters  Add PRN hydralazine     Renal insufficiency   Assessment & Plan    Unknown baseline renal function.    Follow-up creatinine, I/O  Avoid nephrotoxins     History of pacemaker- (present on admission)   Assessment & Plan    This was placed at [facility] April 2018  Outpatient cardiology follow-up          VTE prophylaxis:heparin

## 2019-02-03 NOTE — ASSESSMENT & PLAN NOTE
Mild to moderate ranges   Continue atenolol.  Increase dose if persistently elevated  PRN IV hydralazine

## 2019-02-03 NOTE — ASSESSMENT & PLAN NOTE
Secondary to ground-level fall.  Consulted by Dr. Emanuel, orthopedics, and he recommends this is nonoperative and nonweightbearing and that she should follow-up with a renal orthopedic clinic in 2 weeks for repeat x-rays.  Chronic debility uses walker at home.   Patient remains unsteady, requiring max assist with transfers--continue PT/OT.  Unable to use a walker due to nonweightbearing status of right arm-order DME for wheelchair.  Pain control with as needed Tylenol, oxycodone, IV morphine  Reassess function and try to determine if appropriate for transfer back to assisted living.  Daughter to look into higher level of care side at the assisted living, [facility] .  Arrange home health

## 2019-02-03 NOTE — FACE TO FACE
Face to Face Supporting Documentation - Home Health    The encounter with this patient was in whole or in part the primary reason for home health admission.    Date of encounter:   Patient:                    MRN:                       YOB: 2019  Felicity Dominguez  [id number]  9/23/1927     Home health to see patient for:  Home health aide, Physical Therapy evaluation and treatment and Occupational therapy evaluation and treatment    Skilled need for:  Medication Management Acute right humerus fracture    Skilled nursing interventions to include:  Comment: PT/OT/Aide     Homebound status evidenced by:  Need the aid of supportive devices such as crutches, canes, wheelchairs or walkers, Require the use of special transportation or Needs the assistance of another person in order to leave the home. Leaving home requires a considerable and taxing effort. There is a normal inability to leave the home.    Community Physician to provide follow up care: Pcp Pt States None     Optional Interventions? No      I certify the face to face encounter for this home health care referral meets the CMS requirements and the encounter/clinical assessment with the patient was, in whole, or in part, for the medical condition(s) listed above, which is the primary reason for home health care. Based on my clinical findings: the service(s) are medically necessary, support the need for home health care, and the homebound criteria are met.  I certify that this patient has had a face to face encounter by myself.  Johnathan Gresham M.D. - THUYI: [phone]

## 2019-02-03 NOTE — CARE PLAN
Problem: Pain Management  Goal: Pain level will decrease to patient's comfort goal  Outcome: PROGRESSING AS EXPECTED  Patient is refusing narcotics for pain mgmt requesting tylenol only.     Problem: Skin Integrity  Goal: Risk for impaired skin integrity will decrease  Outcome: PROGRESSING AS EXPECTED  Skin tear cleansed, dressed, and photo uploaded.

## 2019-02-03 NOTE — PROGRESS NOTES
Patient was admitted to [place] [address] orthopedics.  Patient plan for non operative management.  Tolerating regular diet well about 50% of dinner.  Patient states high pain scores and refusing anything stronger then tylenol, however did have narcotics in ETD.  Received on 2L NC of O2, however patient does not normally use will attempt to titrate back down.  Bed alarm, falls precautions in place.  Education started with patient and Granddaughter at bedside.  Sandra Nava GRANDDaughter [phone]   Patient is oriented to room unit and call bell.  Patient initially with asymptomatic hypertension now resolved.  Admission profile completed.  Patients R arm in sling patient notified of NWB status for her arm.  Arm is wrapped and in a sling + radial pulse, + CMS distal to fracture.  Unable to view arm fully for this reason.  Skin tear to her opposing Left arm is uploaded, cleansed and dressed.  Patient offered ice as well.  2 RN skin check completed with Eliza RN and other then the aforementioned skin problems, she has dry flaky skin to BL LE and small scab to Leg.   Rod score is 17 waffle overlay in use with pillow support, repositioning q2 and prn.

## 2019-02-03 NOTE — H&P
Hospital Medicine History & Physical Note    Date of Service  2/2/2019    Primary Care Physician  Dr. Franz    Consultants  Dr. Emanuel, orthopedics    Code Status  full    Chief Complaint  Ground level fall    History of Presenting Illness  89 y.o. female who presented 2/2/2019 with ground level fall and right shoulder pain. Ms. Choi has a past medical history of pacemaker, hypertension, and hypothyroidism that is been her usual state of health until today she was the movie theater and had the misfortune of falling forward and catching herself and landing on her right shoulder.  Paramedics were called she was brought to the emergency room where x-ray reveals a proximal right humerus fracture.  She is been evaluated by Dr. Emanuel, orthopedics, and he recommends that this is nonoperative and she will need to be kept in an immobilized position and nonweightbearing.  Her granddaughters at bedside and states that Ms. Dominguez uses a walker already and thus we will not be able to get around as previous.  She is requiring pain medicines given the severe pain of the right humerus fracture.  To be placed under observation status for pain medications as well as evaluation by physical and occupational therapy for consideration of a walker that would allow her to be mobile yet nonweightbearing on the right shoulder.  She lives at the [facility] living.    Review of Systems  Review of Systems   Constitutional: Negative for chills and fever.   Respiratory: Negative for shortness of breath.    Cardiovascular: Negative for chest pain.   Musculoskeletal: Positive for falls.        Right shoulder pain   All other systems reviewed and are negative.      Past Medical History   has a past medical history of Arthritis; Hypertension; Incontinence; and Osteopenia.  Hypothyroidism    Surgical History   has a past surgical history that includes inguinal hernia repair; abdominal hysterectomy total; appendectomy; cholecystectomy; hip  arthroplasty total (Left); other cardiac surgery; and pacemaker insertion.     Family History  No family history of sudden death    Social History   reports that she has never smoked. She does not have any smokeless tobacco history on file. She reports that she drinks alcohol. She reports that she does not use drugs.  She lives at the [facility]. Her granddaughter is involved in her care.     Allergies  Allergies   Allergen Reactions   • Fluoride Hives and Rash     Gets mouth sores uses flouride free toothpastes.        Medications  Prior to Admission Medications   Prescriptions Last Dose Informant Patient Reported? Taking?   alendronate (FOSAMAX) 70 MG Tab unk at unk MAR from Other Facility Yes Yes   Sig: Take 70 mg by mouth every 7 days.   atenolol (TENORMIN) 25 MG Tab 2/2/2019 at 0800 MAR from Other Facility Yes Yes   Sig: Take 25 mg by mouth every day.   furosemide (LASIX) 20 MG Tab 2/2/2019 at 0800 MAR from Other Facility Yes Yes   Sig: Take 20 mg by mouth every day.   levothyroxine (SYNTHROID) 50 MCG Tab 2/2/2019 at 0600 MAR from Other Facility Yes Yes   Sig: Take 50 mcg by mouth Every morning on an empty stomach.   potassium Chloride ER (K-TAB) 20 MEQ Tab CR tablet 2/2/2019 at 0800 MAR from Other Facility Yes Yes   Sig: Take 20 mEq by mouth every day.      Facility-Administered Medications: None       Physical Exam  Temp:  [35.8 °C (96.5 °F)-36.6 °C (97.9 °F)] 36.5 °C (97.7 °F)  Pulse:  [67-78] 78  Resp:  [16-20] 16  BP: (109-171)/(69-91) 129/69  SpO2:  [92 %-98 %] 98 %    Physical Exam   Constitutional: She is oriented to person, place, and time. No distress.   HENT:   Head: Normocephalic and atraumatic.   Neck: Normal range of motion. Neck supple.   Cardiovascular: Normal rate and regular rhythm.    No murmur heard.  Pulmonary/Chest: No respiratory distress. She has no wheezes.   Left chest pacemaker   Abdominal: Soft. She exhibits no distension. There is no tenderness.   Musculoskeletal: She exhibits edema.    Right arm is in a splint and a sling the right radial artery is palpable   Neurological: She is alert and oriented to person, place, and time.   She is calm and cooperative with exam   Skin: She is not diaphoretic. There is pallor.   She is a skin tear on the left wrist   Psychiatric: She has a normal mood and affect. Her behavior is normal.   Nursing note and vitals reviewed.      Laboratory:  Recent Labs      02/02/19   1309   WBC  10.5   RBC  4.25   HEMOGLOBIN  12.8   HEMATOCRIT  39.4   MCV  92.7   MCH  30.1   MCHC  32.5*   RDW  43.8   PLATELETCT  173   MPV  10.9     Recent Labs      02/02/19   1309   SODIUM  141   POTASSIUM  4.1   CHLORIDE  109   CO2  23   GLUCOSE  127*   BUN  29*   CREATININE  1.22   CALCIUM  9.0     Recent Labs      02/02/19   1309   ALTSGPT  13   ASTSGOT  31   ALKPHOSPHAT  55   TBILIRUBIN  0.8   GLUCOSE  127*     Recent Labs      02/02/19   1309   APTT  25.8   INR  1.09             No results for input(s): TROPONINI in the last 72 hours.    Urinalysis:    No results found     Imaging:  DX-CHEST-PORTABLE (1 VIEW)   Final Result      1.  No acute cardiac or pulmonary abnormality is noted.      2.  Right humeral head and neck fracture again noted.      DX-SHOULDER 2+ RIGHT   Final Result      1.  Acute comminuted fracture of the right humeral head and neck.      2.  No dislocation.            Assessment/Plan:  I anticipate this patient is appropriate for observation status at this time.I do believe the Ms. Dominguez will require at least 2 midnights of hospitalization to get her pain controlled as well as to arrange for physical therapy and occupational therapy to see her and somehow fit her with an appropriate walker allow her to use her left hand and be able to place her right elbow on it in a nonweightbearing form.    * Closed fracture of proximal end of humerus- (present on admission)   Assessment & Plan    Secondary to ground-level fall  She has been seen by Dr. Emanuel, orthopedics, and he  recommends this is nonoperative and nonweightbearing and that she should follow-up with a renal orthopedic clinic in 2 weeks for repeat x-rays  As Ms. Dominguez requires a walker at home for baseline, physical therapy and occupational therapy will be requested to try and set her up with a more appropriate walker that will allow her to be mobile without waiting this arm otherwise she may require wheelchair.  Given her severe pain, will try Tylenol, low-dose oxycodone, and IV morphine if needed especially with transfers and prior to physical and occupational therapy.  She was on Fosamax prior to admission         History of pacemaker- (present on admission)   Assessment & Plan    This was placed at [facility] April 2018     Acquired hypothyroidism- (present on admission)   Assessment & Plan    Continue Synthroid 50 mcg daily     HTN (hypertension)- (present on admission)   Assessment & Plan    Continue home atenolol 25 mg daily with holding parameters         VTE prophylaxis: Heparin

## 2019-02-03 NOTE — CONSULTS
"2/2/2019    Reason for consultation: Right proximal humerus fracture    Consultation on Felicity Dominguez at the request of Dr. Freire for a right proximal humerus fracture.  The patient is a 89 y.o. female who presents with a right proximal humerus fracture due to ground level fall.  The patient noted immediate pain and inability to move the affected extremity due to pain.  They were evaluated in the ER, and Orthopedics was consulted. Patient denies numbness, paresthesias, loss of consciousness or other symptoms.  She is right hand dominant.    Past Medical History:   Diagnosis Date   • Arthritis    • Hypertension    • Incontinence    • Osteopenia        Past Surgical History:   Procedure Laterality Date   • ABDOMINAL HYSTERECTOMY TOTAL     • APPENDECTOMY     • CHOLECYSTECTOMY     • HIP ARTHROPLASTY TOTAL Left    • INGUINAL HERNIA REPAIR     • OTHER CARDIAC SURGERY     • PACEMAKER INSERTION         Medications  No current facility-administered medications on file prior to encounter.      No current outpatient prescriptions on file prior to encounter.       Allergies  Fluoride    ROS  Per HPI. All other systems were reviewed and found to be negative    History reviewed. No pertinent family history.    Social History     Social History   • Marital status:      Spouse name: N/A   • Number of children: N/A   • Years of education: N/A     Social History Main Topics   • Smoking status: Never Smoker   • Smokeless tobacco: Not on file   • Alcohol use Yes      Comment: occasionally   • Drug use: No   • Sexual activity: Not on file     Other Topics Concern   • Not on file     Social History Narrative   • No narrative on file       Physical Exam  Vitals  Blood pressure 129/69, pulse 78, temperature 36.5 °C (97.7 °F), temperature source Temporal, resp. rate 16, height 1.575 m (5' 2\"), weight 75.4 kg (166 lb 3.6 oz), SpO2 98 %, not currently breastfeeding.  General: Well Developed, Well Nourished, no acute " distress  Psychiatric: Alert and oriented x3, appropriate responses to questions, pleasant mood and affect.  HEENT: Normocephalic, atraumatic  Eyes: Anicteric, PERRLA, EOMI  Neck: Supple, nontender, no masses  Chest: Symmetric expansion of the chest wall, non-tender to palpation, no distress.  Heart: RRR, palpable peripheral pulses  Abdomen: Soft, NT, ND  Skin: Intact, no open wounds  Extremities: Tender to palpation right shoulder, no deformity  Neuro: Intact light touch and motor function median/radial/ulnar/axillary on right  Vascular: 2+ DP on right, Capillary refill <2 seconds    Radiographs:  DX-CHEST-PORTABLE (1 VIEW)   Final Result      1.  No acute cardiac or pulmonary abnormality is noted.      2.  Right humeral head and neck fracture again noted.      DX-SHOULDER 2+ RIGHT   Final Result      1.  Acute comminuted fracture of the right humeral head and neck.      2.  No dislocation.          Laboratory Values  Recent Labs      02/02/19   1309   WBC  10.5   RBC  4.25   HEMOGLOBIN  12.8   HEMATOCRIT  39.4   MCV  92.7   MCH  30.1   MCHC  32.5*   RDW  43.8   PLATELETCT  173   MPV  10.9     Recent Labs      02/02/19   1309   SODIUM  141   POTASSIUM  4.1   CHLORIDE  109   CO2  23   GLUCOSE  127*   BUN  29*     Recent Labs      02/02/19   1309   APTT  25.8   INR  1.09         Impression:    #1 Right proximal humerus fracture    Plan:    Non-operative management.  The patient should be immobilized NWB in a sling or shoulder immobilizer, and this should remain in place except for hygiene or dressing.  She should follow up at [facility] in 1-2 weeks for repeat radiographs.

## 2019-02-03 NOTE — PROGRESS NOTES
"Pt resting in bed watching TV, calm, cooperative, pleasant affect. Pt has been seen by PT this a.m. For EDU and mobility. RN reviewed POC which is for pain mgt and continued work w/ therapy for discharge planning. Fall and safety precautions in place, alarm on, pt uses call light appropriately. RN encouraged CDB w/ \"I.S.\" will reinforce t/o day. RN encouraged weight shifts while in bed for skin integrity. Pt is independent w/ turns, on waffle overlay, heels floated. Hourly rounds ongoing, call light w/in reach.  "

## 2019-02-04 LAB
ANION GAP SERPL CALC-SCNC: 8 MMOL/L (ref 0–11.9)
BUN SERPL-MCNC: 23 MG/DL (ref 8–22)
CALCIUM SERPL-MCNC: 8.9 MG/DL (ref 8.5–10.5)
CHLORIDE SERPL-SCNC: 110 MMOL/L (ref 96–112)
CO2 SERPL-SCNC: 23 MMOL/L (ref 20–33)
CREAT SERPL-MCNC: 0.87 MG/DL (ref 0.5–1.4)
GLUCOSE SERPL-MCNC: 97 MG/DL (ref 65–99)
POTASSIUM SERPL-SCNC: 3.5 MMOL/L (ref 3.6–5.5)
SODIUM SERPL-SCNC: 141 MMOL/L (ref 135–145)

## 2019-02-04 PROCEDURE — 700102 HCHG RX REV CODE 250 W/ 637 OVERRIDE(OP): Performed by: HOSPITALIST

## 2019-02-04 PROCEDURE — 97530 THERAPEUTIC ACTIVITIES: CPT

## 2019-02-04 PROCEDURE — G0378 HOSPITAL OBSERVATION PER HR: HCPCS

## 2019-02-04 PROCEDURE — 29125 APPL SHORT ARM SPLINT STATIC: CPT

## 2019-02-04 PROCEDURE — 96372 THER/PROPH/DIAG INJ SC/IM: CPT | Mod: XU

## 2019-02-04 PROCEDURE — 97166 OT EVAL MOD COMPLEX 45 MIN: CPT

## 2019-02-04 PROCEDURE — 700111 HCHG RX REV CODE 636 W/ 250 OVERRIDE (IP): Performed by: HOSPITALIST

## 2019-02-04 PROCEDURE — 36415 COLL VENOUS BLD VENIPUNCTURE: CPT

## 2019-02-04 PROCEDURE — 99225 PR SUBSEQUENT OBSERVATION CARE,LEVEL II: CPT | Performed by: HOSPITALIST

## 2019-02-04 PROCEDURE — A9270 NON-COVERED ITEM OR SERVICE: HCPCS | Performed by: HOSPITALIST

## 2019-02-04 PROCEDURE — 80048 BASIC METABOLIC PNL TOTAL CA: CPT

## 2019-02-04 RX ORDER — POTASSIUM CHLORIDE 20 MEQ/1
20 TABLET, EXTENDED RELEASE ORAL DAILY
Status: DISCONTINUED | OUTPATIENT
Start: 2019-02-05 | End: 2019-02-05 | Stop reason: HOSPADM

## 2019-02-04 RX ORDER — OXYCODONE HYDROCHLORIDE 5 MG/1
5 TABLET ORAL EVERY 6 HOURS PRN
Qty: 20 TAB | Refills: 0 | Status: SHIPPED | OUTPATIENT
Start: 2019-02-04 | End: 2019-02-05

## 2019-02-04 RX ADMIN — HEPARIN SODIUM 5000 UNITS: 5000 INJECTION, SOLUTION INTRAVENOUS; SUBCUTANEOUS at 15:39

## 2019-02-04 RX ADMIN — OXYCODONE HYDROCHLORIDE 5 MG: 5 TABLET ORAL at 07:45

## 2019-02-04 RX ADMIN — HEPARIN SODIUM 5000 UNITS: 5000 INJECTION, SOLUTION INTRAVENOUS; SUBCUTANEOUS at 20:22

## 2019-02-04 ASSESSMENT — ENCOUNTER SYMPTOMS
NECK PAIN: 0
BACK PAIN: 0
SHORTNESS OF BREATH: 0
FEVER: 0
MYALGIAS: 1
COUGH: 0
FALLS: 1
SPEECH CHANGE: 0
WEAKNESS: 1
VOMITING: 0
SENSORY CHANGE: 0
DIARRHEA: 0
CHILLS: 0
ABDOMINAL PAIN: 0

## 2019-02-04 ASSESSMENT — COGNITIVE AND FUNCTIONAL STATUS - GENERAL
DRESSING REGULAR LOWER BODY CLOTHING: A LOT
PERSONAL GROOMING: A LITTLE
TOILETING: TOTAL
EATING MEALS: A LITTLE
DRESSING REGULAR UPPER BODY CLOTHING: A LOT
CLIMB 3 TO 5 STEPS WITH RAILING: TOTAL
TURNING FROM BACK TO SIDE WHILE IN FLAT BAD: UNABLE
WALKING IN HOSPITAL ROOM: A LOT
DAILY ACTIVITIY SCORE: 13
SUGGESTED CMS G CODE MODIFIER MOBILITY: CM
HELP NEEDED FOR BATHING: A LOT
MOBILITY SCORE: 8
SUGGESTED CMS G CODE MODIFIER DAILY ACTIVITY: CL
MOVING FROM LYING ON BACK TO SITTING ON SIDE OF FLAT BED: UNABLE
STANDING UP FROM CHAIR USING ARMS: A LOT
MOVING TO AND FROM BED TO CHAIR: UNABLE

## 2019-02-04 ASSESSMENT — ACTIVITIES OF DAILY LIVING (ADL): TOILETING: INDEPENDENT

## 2019-02-04 ASSESSMENT — GAIT ASSESSMENTS: GAIT LEVEL OF ASSIST: UNABLE TO PARTICIPATE

## 2019-02-04 NOTE — CARE PLAN
Problem: Safety  Goal: Will remain free from falls    Intervention: Implement fall precautions  Pt calls appropriately, treaded socks in use. Personal belongings and call light with in reach and bed is locked in lowest position. Bed alarm in use      Problem: Infection  Goal: Will remain free from infection    Intervention: Implement standard precautions and perform hand washing before and after patient contact  Proper use of PPE at all times  hand hygiene implemented upon entry and exit of patients room.  Education provided on keeping hands clean and wound clean.

## 2019-02-04 NOTE — CARE PLAN
Problem: Infection  Goal: Will remain free from infection  Outcome: PROGRESSING AS EXPECTED  Pt is not showing s/s of developing an infection at this time. Rn washes hands before entering room and pt washes hands after using restroom.     Problem: Bowel/Gastric:  Goal: Normal bowel function is maintained or improved  Outcome: PROGRESSING AS EXPECTED  Pt is having regular BM at this time and is not experiencing abdominal pain or discomfort.

## 2019-02-04 NOTE — PROGRESS NOTES
Hospital Medicine Daily Progress Note    Date of Service  2/4/2019    Chief Complaint  89 y.o. female admitted 2/2/2019 post fall with right shoulder pain.    Hospital Course    Patient with history of pacemaker, hypertension, hypothyroidism, chronic debility with walker dependence,  currently resides at [facility] who was admitted after she fell on her right shoulder.  X-ray in the ER revealed proximal right humerus fracture.  Dr. Emanuel orthopedics consulted and is recommended nonoperative treatment with nonweightbearing activities and sling.    Interval Problem Update    Patient remains unsteady with use of walker.  Max assist to transfer out of bed.  She tends to try to favor use of her arm when using walker despite nonweightbearing status.  Discussed with multidisciplinary team, daughter to look into higher level care at the [facility].  Plan continued PT/OT.     Consultants/Specialty  Orthopedics Dr. Emanuel    Code Status  Full    Disposition  Plan PT/OT    Review of Systems  Review of Systems   Constitutional: Negative for chills and fever.   Respiratory: Negative for cough and shortness of breath.    Cardiovascular: Negative for chest pain.   Gastrointestinal: Negative for abdominal pain, diarrhea and vomiting.   Musculoskeletal: Positive for falls, joint pain and myalgias. Negative for back pain and neck pain.   Neurological: Positive for weakness. Negative for sensory change and speech change.        Physical Exam  Temp:  [36 °C (96.8 °F)-36.7 °C (98.1 °F)] 36 °C (96.8 °F)  Pulse:  [60-87] 87  Resp:  [16-18] 16  BP: (149-166)/(57-74) 166/66  SpO2:  [90 %-94 %] 94 %    Physical Exam   Constitutional: No distress.   Alert, appropriate oriented, no apparent distress   HENT:   Head: Normocephalic and atraumatic.   Eyes: EOM are normal. Right eye exhibits no discharge. Left eye exhibits no discharge. No scleral icterus.   Neck: Neck supple.   Cardiovascular: Normal rate and regular rhythm.     No murmur heard.  Pulmonary/Chest: Effort normal. No stridor. She has no wheezes. She has no rales.   Abdominal: Soft. She exhibits no distension. There is no tenderness.   Musculoskeletal: She exhibits edema (1+ lower extremity edema) and tenderness.   Right arm wrapped and in sling.  Distal extremity sensation intact, no cyanosis   Neurological: She is alert.   Skin: Skin is warm and dry. She is not diaphoretic. No pallor.   Vitals reviewed.      Fluids    Intake/Output Summary (Last 24 hours) at 02/04/19 1448  Last data filed at 02/03/19 2200   Gross per 24 hour   Intake              100 ml   Output                0 ml   Net              100 ml       Laboratory  Recent Labs      02/02/19   1309   WBC  10.5   RBC  4.25   HEMOGLOBIN  12.8   HEMATOCRIT  39.4   MCV  92.7   MCH  30.1   MCHC  32.5*   RDW  43.8   PLATELETCT  173   MPV  10.9     Recent Labs      02/02/19   1309  02/04/19   0342   SODIUM  141  141   POTASSIUM  4.1  3.5*   CHLORIDE  109  110   CO2  23  23   GLUCOSE  127*  97   BUN  29*  23*   CREATININE  1.22  0.87   CALCIUM  9.0  8.9     Recent Labs      02/02/19   1309   APTT  25.8   INR  1.09               Imaging  DX-CHEST-PORTABLE (1 VIEW)   Final Result      1.  No acute cardiac or pulmonary abnormality is noted.      2.  Right humeral head and neck fracture again noted.      DX-SHOULDER 2+ RIGHT   Final Result      1.  Acute comminuted fracture of the right humeral head and neck.      2.  No dislocation.           Assessment/Plan  * Closed fracture of proximal end of humerus- (present on admission)   Assessment & Plan    Secondary to ground-level fall.  Consulted by Dr. Emanuel, orthopedics, and he recommends this is nonoperative and nonweightbearing and that she should follow-up with a renal orthopedic clinic in 2 weeks for repeat x-rays.  Chronic debility uses walker at home.   Patient remains unsteady, requiring max assist with transfers--continue PT/OT.  Unable to use a walker due to  nonweightbearing status of right arm-order DME for wheelchair.  Pain control with as needed Tylenol, oxycodone, IV morphine  Reassess function and try to determine if appropriate for transfer back to assisted living.  Daughter to look into higher level of care side at the assisted living, [facility] .  Arrange home health         Acquired hypothyroidism- (present on admission)   Assessment & Plan    Continue Synthroid 50 mcg daily     HTN (hypertension)- (present on admission)   Assessment & Plan    Mild to moderate ranges   Continue atenolol.  Increase dose if persistently elevated  PRN IV hydralazine     Renal insufficiency   Assessment & Plan    Acute, possible prerenal-resolved       History of pacemaker- (present on admission)   Assessment & Plan    This was placed at [facility] April 2018  Outpatient cardiology follow-up          VTE prophylaxis:heparin       Discussed with multidisciplinary team plan of care.

## 2019-02-04 NOTE — FACE TO FACE
Face to Face Note  -  Durable Medical Equipment    Johnathan Gresham M.D. - NPI: [id number]  I certify that this patient is under my care and that they had a durable medical equipment(DME)face to face encounter by myself that meets the physician DME face-to-face encounter requirements with this patient on:    Date of encounter:   Patient:                    MRN:                       YOB: 2019  Felicity Dominguez  [id number]  9/23/1927     The encounter with the patient was in whole, or in part, for the following medical condition, which is the primary reason for durable medical equipment:  Other - Right humerus fracture, weakness, unsteady gait    I certify that, based on my findings, the following durable medical equipment is medically necessary:  Wheel Chair.    HOME O2 Saturation Measurements:(Values must be present for Home Oxygen orders)         ,     ,         My Clinical findings support the need for the above equipment due to:  Abnormal Gait    Supporting Symptoms: Weakness, falls, unsteady gait

## 2019-02-04 NOTE — THERAPY
"Occupational Therapy Evaluation completed.   Functional Status:  Mod A supine to sit.  Max A LB dressing.  Pt incontinent upon arrival w/total A for hygiene standing EOB with physical assist for balance from PT.  Pt transferred to [facility] with mod A.  Pt s/u to eat breakfast with min A.  Pt with limited ability to complete self-care 2' to RUE pain and limited use of dominant UE.  Pt was completing self-care independently at [facility] prior to fall.  Plan of Care: Will benefit from Occupational Therapy 3 times per week  Discharge Recommendations:  Equipment: Will Continue to Assess for Equipment Needs. Pt will benefit from further therapy at SNF prior to DC home.    See \"Rehab Therapy-Acute\" Patient Summary Report for complete documentation.    "

## 2019-02-05 ENCOUNTER — HOME HEALTH ADMISSION (OUTPATIENT)
Dept: HOME HEALTH SERVICES | Facility: HOME HEALTHCARE | Age: 84
End: 2019-02-05
Payer: MEDICARE

## 2019-02-05 ENCOUNTER — PATIENT OUTREACH (OUTPATIENT)
Dept: HEALTH INFORMATION MANAGEMENT | Facility: OTHER | Age: 84
End: 2019-02-05

## 2019-02-05 VITALS
SYSTOLIC BLOOD PRESSURE: 113 MMHG | RESPIRATION RATE: 15 BRPM | OXYGEN SATURATION: 96 % | DIASTOLIC BLOOD PRESSURE: 71 MMHG | TEMPERATURE: 97.4 F | HEIGHT: 62 IN | BODY MASS INDEX: 30.59 KG/M2 | WEIGHT: 166.23 LBS | HEART RATE: 65 BPM

## 2019-02-05 PROCEDURE — 99217 PR OBSERVATION CARE DISCHARGE: CPT | Performed by: INTERNAL MEDICINE

## 2019-02-05 PROCEDURE — A9270 NON-COVERED ITEM OR SERVICE: HCPCS | Performed by: HOSPITALIST

## 2019-02-05 PROCEDURE — 700111 HCHG RX REV CODE 636 W/ 250 OVERRIDE (IP): Performed by: HOSPITALIST

## 2019-02-05 PROCEDURE — 96372 THER/PROPH/DIAG INJ SC/IM: CPT

## 2019-02-05 PROCEDURE — 700102 HCHG RX REV CODE 250 W/ 637 OVERRIDE(OP): Performed by: HOSPITALIST

## 2019-02-05 PROCEDURE — 302146: Performed by: HOSPITALIST

## 2019-02-05 PROCEDURE — G0378 HOSPITAL OBSERVATION PER HR: HCPCS

## 2019-02-05 RX ORDER — OXYCODONE HYDROCHLORIDE 5 MG/1
5 TABLET ORAL EVERY 6 HOURS PRN
Qty: 20 TAB | Refills: 0 | Status: SHIPPED | OUTPATIENT
Start: 2019-02-05 | End: 2019-02-05

## 2019-02-05 RX ORDER — OXYCODONE HYDROCHLORIDE 5 MG/1
5 TABLET ORAL EVERY 6 HOURS PRN
Qty: 20 TAB | Refills: 0 | Status: SHIPPED | OUTPATIENT
Start: 2019-02-05 | End: 2019-02-10

## 2019-02-05 RX ADMIN — HEPARIN SODIUM 5000 UNITS: 5000 INJECTION, SOLUTION INTRAVENOUS; SUBCUTANEOUS at 06:00

## 2019-02-05 RX ADMIN — ATENOLOL 25 MG: 50 TABLET ORAL at 05:20

## 2019-02-05 RX ADMIN — OXYCODONE HYDROCHLORIDE 2.5 MG: 5 TABLET ORAL at 14:44

## 2019-02-05 RX ADMIN — ACETAMINOPHEN 650 MG: 325 TABLET, FILM COATED ORAL at 14:44

## 2019-02-05 RX ADMIN — HEPARIN SODIUM 5000 UNITS: 5000 INJECTION, SOLUTION INTRAVENOUS; SUBCUTANEOUS at 14:44

## 2019-02-05 RX ADMIN — POTASSIUM CHLORIDE 20 MEQ: 1500 TABLET, EXTENDED RELEASE ORAL at 05:20

## 2019-02-05 RX ADMIN — LEVOTHYROXINE SODIUM 50 MCG: 25 TABLET ORAL at 05:20

## 2019-02-05 ASSESSMENT — PATIENT HEALTH QUESTIONNAIRE - PHQ9
1. LITTLE INTEREST OR PLEASURE IN DOING THINGS: NOT AT ALL
SUM OF ALL RESPONSES TO PHQ9 QUESTIONS 1 AND 2: 0
2. FEELING DOWN, DEPRESSED, IRRITABLE, OR HOPELESS: NOT AT ALL

## 2019-02-05 NOTE — DISCHARGE PLANNING
anisha the MD office back. Dr ng saw her at [facility] back in May. The office didnt know if he was still going there so they cannot tell me if he will follow or not. They sent the MA a message so as soon as HH hears from the office we will notify ROSALIA Montesinos

## 2019-02-05 NOTE — DISCHARGE PLANNING
Patient unable to be transferred to SNF due to OBS status.    Contacted granddaughter this am. She stated she has spoken to [facility] the AL facility and a nurse will be here today at 11:15 to evaluate her for increased level of care at the facility. Granddaughter, Emilie has also set up with [facility] private caregiver to come assist her 3 hours in the am and 3 hours pm. Home health pending for therapies as well as DME for a wheelchair.

## 2019-02-05 NOTE — DISCHARGE PLANNING
Received Choice form at 0806  Agency/Facility Name: [facility]  Referral sent per Choice form @ [phone]

## 2019-02-05 NOTE — DISCHARGE PLANNING
We are currently verifying MD acceptance of your patient. This will be resolved ASAP. I Thank you for your patience.  Respectfully,   [facility]

## 2019-02-05 NOTE — PROGRESS NOTES
"Upon entering room, RN finds patient's arm sling is no longer intact. Patient states \" I took it off because it doesn't belong there and I need to get back into bed\" Patient appears slightly disoriented. Attempted to reorient patient. Dr. Oneal PA called at this time. Orders for traction to reapply sling obtained.   "

## 2019-02-05 NOTE — PROGRESS NOTES
Rewrapped co-op splint to patients RUE with help from RN. Patient is comfortable and has positive cms pre and post application

## 2019-02-05 NOTE — PROGRESS NOTES
Bedside shift report received. Assumed care of patient at this time. Patient sitting up in chair at bedside, watching tv. Call light and personal items within reach. No further requests at this time.

## 2019-02-05 NOTE — DISCHARGE PLANNING
Agency/Facility Name: [facility]  Spoke To: Karuna  Outcome: Patient is accepted and they will deliver to [facility] when patient is DC.

## 2019-02-05 NOTE — DISCHARGE PLANNING
Received Choice form at 0868  Agency/Facility Name: [facility]  Referral sent per Choice form @ [id number]

## 2019-02-05 NOTE — DISCHARGE PLANNING
Per patient PCP office patient to establish on 2/19/2019. Please be advised that  will not be able to see this patient until this appointment is kept.

## 2019-02-05 NOTE — DISCHARGE SUMMARY
Hospital Medicine Discharge Note     Admit Date:  2/2/2019       Discharge Date:   2/5/2019    Attending Physician:  Marlo Siddiqui M.D.     Presenting complaint: Ground-level fall    Diagnoses (includes active and resolved):     Principal Problem:    Closed fracture of proximal end of humerus POA: Yes  Active Problems:    HTN (hypertension) POA: Yes    Acquired hypothyroidism POA: Yes    History of pacemaker POA: Yes    Renal insufficiency POA: Unknown  Resolved Problems:  Renal insufficiency    Hospital Summary (Brief Narrative):         89-year-old female history of pacemaker, hypertension, hypothyroidism, chronic debility with walker dependence who resides at [facility] living admitted on February 2, 2019 after  fall with right shoulder pain.  She underwent X-ray in the ER revealed proximal right humerus fracture.  Dr. Emanuel orthopedics consulted and is recommended nonoperative treatment with nonweightbearing activities and sling.  She was unsteady requiring maximum assist with transfers out of bed.    She would favor use of her arm when using walker despite nonweightbearing status.  I ordered home health for her and will check was arranged.  Discussed with daughter and patient and was instructed to have outpatient follow-up with [facility] in 2 weeks for repeat x-rays.  Continue not weightbearing status with use of sling..  I evaluated/examined her at the bedside and she denies any acute complaints and she was able to be discharge.  Discussed plan of care with her and I answered all her questions.  She needs to follow-up with orthopedic surgery in 1-2 weeks with repeat imaging.      Consultants:      Orthopedic surgery (Dr. Emanuel)    Follow-up:    Orthopedic surgery with repeat imaging in 1-2 weeks  Primary care provider    Imaging/ Testing:      DX-CHEST-PORTABLE (1 VIEW)   Final Result      1.  No acute cardiac or pulmonary abnormality is noted.      2.  Right humeral head and neck  fracture again noted.      DX-SHOULDER 2+ RIGHT   Final Result      1.  Acute comminuted fracture of the right humeral head and neck.      2.  No dislocation.            Procedures:          None     Discharge Medications:             Medication List      START taking these medications      Instructions   oxyCODONE immediate-release 5 MG Tabs  Commonly known as:  ROXICODONE   Take 1 Tab by mouth every 6 hours as needed for Severe Pain for up to 5 days.  Dose:  5 mg        CONTINUE taking these medications      Instructions   alendronate 70 MG Tabs  Commonly known as:  FOSAMAX   Take 70 mg by mouth every 7 days.  Dose:  70 mg     atenolol 25 MG Tabs  Commonly known as:  TENORMIN   Take 25 mg by mouth every day.  Dose:  25 mg     furosemide 20 MG Tabs  Commonly known as:  LASIX   Take 20 mg by mouth every day.  Dose:  20 mg     K-TAB 20 MEQ Tbcr tablet  Generic drug:  potassium Chloride ER   Take 20 mEq by mouth every day.  Dose:  20 mEq     levothyroxine 50 MCG Tabs  Commonly known as:  SYNTHROID   Take 50 mcg by mouth Every morning on an empty stomach.  Dose:  50 mcg               Diet:       DIET ORDERS (Through next 24h)    Start     Ordered    02/02/19 1423  Diet Order Regular  ALL MEALS     Question:  Diet:  Answer:  Regular    02/02/19 1422            Activity:   Nonweightbearing to right upper extremity with use of sling      Code status:   Full code    Primary Care Provider:    Denver J Miller, M.D.    Follow up appointment details :      .  Denver J Miller, M.D.  [address]  [facility] [address]  [phone]    In 2 weeks      Denny Emanuel M.D.  [address] [facility] [address]  [phone]    In 2 weeks          DX-CHEST-PORTABLE (1 VIEW)   Final Result      1.  No acute cardiac or pulmonary abnormality is noted.      2.  Right humeral head and neck fracture again noted.      DX-SHOULDER 2+ RIGHT   Final Result      1.  Acute comminuted fracture of the right humeral head and neck.      2.  No  dislocation.            Time spent on discharge day patient visit 34 minutes

## 2019-02-05 NOTE — DISCHARGE PLANNING
Per KIAN patient has established with this MD. Advised KIAN I called the office and they are saying she hasnt been seen. Said she will talk to the granddaughter and get clarification then call us back.

## 2019-02-05 NOTE — DISCHARGE INSTRUCTIONS
Discharge Instructions    Discharged to home by medical transportation with escort. Discharged via wheelchair, hospital escort: Yes.  Special equipment needed: Wheelchair    Be sure to schedule a follow-up appointment with your primary care doctor or any specialists as instructed.     Discharge Plan: Non-weight bearing to right arm.The patient should be immobilized in a sling, and this should remain in place except for hygiene or dressing.   Diet Plan: Discussed  Activity Level: Discussed  Confirmed Follow up Appointment: Patient to Call and Schedule Appointment  Confirmed Symptoms Management: Discussed  Medication Reconciliation Updated: Yes  Pneumococcal Vaccine Administered/Refused: Not given - Patient refused pneumococcal vaccine  Influenza Vaccine Indication: Patient Refuses    I understand that a diet low in cholesterol, fat, and sodium is recommended for good health. Unless I have been given specific instructions below for another diet, I accept this instruction as my diet prescription.   Other diet: regular    Special Instructions: None    · Is patient discharged on Warfarin / Coumadin?   No       Humerus Fracture Treated With Immobilization  A humerus fracture is a break in the large bone in the upper arm (humerus). If the joint is stable and the bones are still in their normal position (nondisplaced), the injury may be treated with immobilization. This involves the use of a cast, splint, or sling to hold your arm in place. Immobilization ensures that your bones continue to stay in the correct position while your arm is healing.  What are the causes?  This condition may be caused by:  · A fall.  · A hard, direct hit to the arm.  · A motor vehicle accident.  What increases the risk?  This condition is more likely to develop in:  · Elderly people.  · People who have a disease that makes the bones thin and weak (osteoporosis).  What are the signs or symptoms?  Symptoms of this condition  include:  · Pain.  · Swelling.  · Bruising.  · Not being able to move the arm normally.  How is this diagnosed?  This condition may be diagnosed based on a physical exam and X-rays. X-rays of your upper arm, elbow, and shoulder may be done. You may also have a CT scan.  How is this treated?  Treatment for this condition involves wearing a cast, splint, or sling until the injured area is stable enough for you to begin range-of-motion exercises. You may also be prescribed pain medicine.  Follow these instructions at home:  If you have a cast:  · Do not stick anything inside the cast to scratch your skin. Doing that increases your risk of infection.  · Check the skin around the cast every day. Report any concerns to your health care provider. You may put lotion on dry skin around the edges of the cast. Do not apply lotion to the skin underneath the cast.  · Keep the cast clean and dry as told by your health care provider.  If you have a splint:  · Wear it as told by your health care provider. Remove it only as told by your health care provider.  · Loosen the splint if your fingers become numb and tingle, or if they turn cold and blue.  · Keep the splint clean and dry.  If you have a sling:  · Wear it as told by your health care provider. Remove it only as told by your health care provider.  Bathing  · Do not take baths, swim, or use a hot tub until your health care provider approves. Ask your health care provider if you can take showers. You may only be allowed to take sponge baths for bathing.  · If your cast or splint is not waterproof, cover it with a watertight plastic bag while you take a bath or a shower. Do not let the cast or splint get wet.  · If you have a sling, remove it for bathing only if your health care provider tells you that it is safe to do that.  Managing pain, stiffness, and swelling  · If directed, apply ice to the injured area.  ¨ Put ice in a plastic bag.  ¨ Place a towel between your skin and  the bag.  ¨ Leave the ice on for 20 minutes, 2-3 times per day.  · Move your fingers often to avoid stiffness and to lessen swelling.  · Raise (elevate) the injured area above the level of your heart while you are sitting or lying down.  Driving  · Do not drive or operate heavy machinery while taking prescription pain medicine.  · Do not drive while wearing a cast, splint, or sling on an arm that you use for driving.  Activity  · Return to your normal activities as told by your health care provider. Ask your health care provider what activities are safe for you.  · Perform range-of-motion exercises only as told by your health care provider or physical therapist.  General instructions  · Do not put pressure on any part of the cast or splint until it is fully hardened. This may take several hours.  · Do not use any tobacco products, including cigarettes, chewing tobacco, or e-cigarettes. Tobacco can delay bone healing. If you need help quitting, ask your health care provider.  · Take over-the-counter and prescription medicines only as told by your health care provider.  · Keep all follow-up visits as told by your health care provider. This is important.  Contact a health care provider if:  · You have any new pain, swelling, or bruising.  · Your pain, swelling, and bruising do not improve.  · Your cast, splint, or sling becomes loose or damaged.  Get help right away if:  · Your skin or fingers on your injured arm turn blue or gray.  · Your arm feels cold or numb.  · You have severe pain in your injured arm.  This information is not intended to replace advice given to you by your health care provider. Make sure you discuss any questions you have with your health care provider.  Document Released: 03/26/2002 Document Revised: 05/25/2017 Document Reviewed: 05/11/2016  [employer] Patient Education © 2017 [employer]      Depression / Suicide Risk    As you are discharged from this [facility] facility, it is  important to learn how to keep safe from harming yourself.    Recognize the warning signs:  · Abrupt changes in personality, positive or negative- including increase in energy   · Giving away possessions  · Change in eating patterns- significant weight changes-  positive or negative  · Change in sleeping patterns- unable to sleep or sleeping all the time   · Unwillingness or inability to communicate  · Depression  · Unusual sadness, discouragement and loneliness  · Talk of wanting to die  · Neglect of personal appearance   · Rebelliousness- reckless behavior  · Withdrawal from people/activities they love  · Confusion- inability to concentrate     If you or a loved one observes any of these behaviors or has concerns about self-harm, here's what you can do:  · Talk about it- your feelings and reasons for harming yourself  · Remove any means that you might use to hurt yourself (examples: pills, rope, extension cords, firearm)  · Get professional help from the community (Mental Health, Substance Abuse, psychological counseling)  · Do not be alone:Call your Safe Contact- someone whom you trust who will be there for you.  · Call your local CRISIS HOTLINE 255-5385 or 925-082-1574  · Call your local Children's Mobile Crisis Response Team [place] (582) 175-8618 or www.[employer]  · Call the toll free National Suicide Prevention Hotlines   · National Suicide Prevention Lifeline 584-186-EIMR (5994)  · National Hope Line Network 800-SUICIDE (495-6979)

## 2019-02-05 NOTE — DISCHARGE PLANNING
Agency/Facility Name: [facility]  Spoke To: Katie  Outcome: Patient does  Not qualify for a wheelchair, if the order is updated to a [facility] with plate form they can approve that.

## 2019-02-05 NOTE — DISCHARGE PLANNING
Met with starla Phan's Shamika ervin director of [facility] and bedside nurse regarding transfer and care for patient. Jonah stated she purchased a bedside commode, wheel chair ordered and will be delivered to [facility] from [facility]. Director asked that physician complete report for their records. They will need scripts for any new medications patient is now taking. [facility].

## 2019-02-05 NOTE — DISCHARGE PLANNING
Patient's grand-daughter Emilie is finding her PCP and last time she was seen.  set up with [facility] to be delivered to the hospital.

## 2019-02-05 NOTE — DISCHARGE PLANNING
Good morning and thank you for alerting [facility] to this patient,  She will be placed on hold as there is no Home Health referral entered into EPIC.  Please place the referral so that we may process it.  Thank you!

## 2019-02-05 NOTE — DISCHARGE PLANNING
ATTN: Case Management  RE: Referral for Home Health    As of 2/5/2019, we have accepted the Home Health referral for the patient listed above.    A [facility] clinician will be out to see the patient within 48 hours. If you have any questions or concerns regarding the patient’s transition to Home Health, please do not hesitate to contact us at [phone].      We look forward to collaborating with you,  [facility] Home Health Team

## 2019-02-05 NOTE — THERAPY
"Physical Therapy Treatment completed.   Bed Mobility:  Supine to Sit: Moderate Assist  Transfers: Sit to Stand: Moderate Assist  Gait: Level Of Assist: Unable to Participate with No Equipment Needed       Plan of Care: Will benefit from Physical Therapy 3 times per week  Discharge Recommendations: Equipment: Will Continue to Assess for Equipment Needs. Post-acute therapy Recommend inpatient transitional care services for continued physical therapy services.      Improved bed mobility and pt able to sit EOB without LOB. Significant posterior lean when coming to stand, pt very fearful of R UE being touched. Manual facilitation provided at patient's PSIS to achieve hip extension and then increase anterior weight shift. Able to succesfully stand with this input. Pt then was able to take pivot steps to sit in chair. Requires max time and cues for sequencing and fear but pt is very motivated. Continue to recommend post acute placement prior to discharge home. Acute PT to continue to follow while in house.     See \"Rehab Therapy-Acute\" Patient Summary Report for complete documentation.       "

## 2019-02-05 NOTE — PROGRESS NOTES
Awake and alert. Denies pain at this time. CINDA remains in soft cast, CDI, no issues noted. Up one person assist to the chair. Pt up for all meals. No PIV, MD aware. Education done on POC, including d/c back to [facility] assisted living [facility], all questions and concerns were addressed. All needs met at this time per pt

## 2019-02-06 NOTE — DISCHARGE PLANNING
Bedside nurse spoke again with funmilayo Phan. She confirmed with Olamide at Dr. Franz's office that patient has been seen in May and he will continue to see her.  for  nurse can follow up with Olamide at Dr. Franz's office to confirm.

## 2019-02-06 NOTE — PROGRESS NOTES
Discharge order received. No PIV. Printed discharge instructions and prescriptions given to pt. All discharge education complete, specifically need to f/u with [facility] in 2 wk, PCP, HH, wheelchair to be delivered to [facility] and come back through ED for new or worsening symptoms , all questions and concerns were addressed. VSS.     This RN transported pt down to [facility] via

## 2019-02-07 ENCOUNTER — TELEPHONE (OUTPATIENT)
Dept: HEALTH INFORMATION MANAGEMENT | Facility: OTHER | Age: 84
End: 2019-02-07

## 2019-02-07 ENCOUNTER — HOME CARE VISIT (OUTPATIENT)
Dept: HOME HEALTH SERVICES | Facility: HOME HEALTHCARE | Age: 84
End: 2019-02-07
Payer: MEDICARE

## 2019-02-07 VITALS
BODY MASS INDEX: 23 KG/M2 | WEIGHT: 125 LBS | DIASTOLIC BLOOD PRESSURE: 48 MMHG | SYSTOLIC BLOOD PRESSURE: 102 MMHG | HEART RATE: 68 BPM | OXYGEN SATURATION: 95 % | TEMPERATURE: 99.1 F | HEIGHT: 62 IN | RESPIRATION RATE: 16 BRPM

## 2019-02-07 PROCEDURE — G0493 RN CARE EA 15 MIN HH/HOSPICE: HCPCS

## 2019-02-07 PROCEDURE — 665998 HH PPS REVENUE CREDIT

## 2019-02-07 PROCEDURE — 665001 SOC-HOME HEALTH

## 2019-02-07 PROCEDURE — 665999 HH PPS REVENUE DEBIT

## 2019-02-07 ASSESSMENT — ENCOUNTER SYMPTOMS
NAUSEA: DENIES
DIFFICULTY THINKING: 1
VOMITING: DENIES
POOR JUDGMENT: 1

## 2019-02-07 ASSESSMENT — ACTIVITIES OF DAILY LIVING (ADL): OASIS_M1830: 06

## 2019-02-07 ASSESSMENT — PATIENT HEALTH QUESTIONNAIRE - PHQ9
CLINICAL INTERPRETATION OF PHQ2 SCORE: 0
2. FEELING DOWN, DEPRESSED, IRRITABLE, OR HOPELESS: 00
1. LITTLE INTEREST OR PLEASURE IN DOING THINGS: 00

## 2019-02-07 NOTE — TELEPHONE ENCOUNTER
Received referral from [facility]. Medications reviewed. No clinically significant interactions noted.     Jamee Galeana, PharmD

## 2019-02-08 PROCEDURE — 665999 HH PPS REVENUE DEBIT

## 2019-02-08 PROCEDURE — 665998 HH PPS REVENUE CREDIT

## 2019-02-09 PROCEDURE — 665998 HH PPS REVENUE CREDIT

## 2019-02-09 PROCEDURE — 665999 HH PPS REVENUE DEBIT

## 2019-02-10 PROCEDURE — 665999 HH PPS REVENUE DEBIT

## 2019-02-10 PROCEDURE — 665998 HH PPS REVENUE CREDIT

## 2019-02-11 PROCEDURE — 665999 HH PPS REVENUE DEBIT

## 2019-02-11 PROCEDURE — 665998 HH PPS REVENUE CREDIT

## 2019-02-12 ENCOUNTER — HOME CARE VISIT (OUTPATIENT)
Dept: HOME HEALTH SERVICES | Facility: HOME HEALTHCARE | Age: 84
End: 2019-02-12
Payer: MEDICARE

## 2019-02-12 VITALS
DIASTOLIC BLOOD PRESSURE: 68 MMHG | OXYGEN SATURATION: 94 % | HEART RATE: 63 BPM | TEMPERATURE: 98.1 F | SYSTOLIC BLOOD PRESSURE: 120 MMHG

## 2019-02-12 VITALS
RESPIRATION RATE: 18 BRPM | DIASTOLIC BLOOD PRESSURE: 70 MMHG | HEART RATE: 81 BPM | SYSTOLIC BLOOD PRESSURE: 110 MMHG | TEMPERATURE: 98.4 F | OXYGEN SATURATION: 93 %

## 2019-02-12 PROCEDURE — 665999 HH PPS REVENUE DEBIT

## 2019-02-12 PROCEDURE — G0151 HHCP-SERV OF PT,EA 15 MIN: HCPCS

## 2019-02-12 PROCEDURE — 665998 HH PPS REVENUE CREDIT

## 2019-02-12 PROCEDURE — G0495 RN CARE TRAIN/EDU IN HH: HCPCS

## 2019-02-12 ASSESSMENT — ENCOUNTER SYMPTOMS
NAUSEA: DENIES C/O NAUSEA
DIFFICULTY THINKING: 1
VOMITING: DENIES ANY VOMITING

## 2019-02-13 ENCOUNTER — HOME CARE VISIT (OUTPATIENT)
Dept: HOME HEALTH SERVICES | Facility: HOME HEALTHCARE | Age: 84
End: 2019-02-13
Payer: MEDICARE

## 2019-02-13 PROCEDURE — G0152 HHCP-SERV OF OT,EA 15 MIN: HCPCS

## 2019-02-13 PROCEDURE — 665998 HH PPS REVENUE CREDIT

## 2019-02-13 PROCEDURE — 665999 HH PPS REVENUE DEBIT

## 2019-02-14 VITALS
OXYGEN SATURATION: 91 % | SYSTOLIC BLOOD PRESSURE: 107 MMHG | RESPIRATION RATE: 18 BRPM | HEART RATE: 60 BPM | TEMPERATURE: 98.6 F | DIASTOLIC BLOOD PRESSURE: 61 MMHG

## 2019-02-14 PROCEDURE — 665998 HH PPS REVENUE CREDIT

## 2019-02-14 PROCEDURE — 665999 HH PPS REVENUE DEBIT

## 2019-02-14 ASSESSMENT — ACTIVITIES OF DAILY LIVING (ADL)
TELEPHONE_ASSISTANCE: 6
SHOPPING_ASSISTANCE: 6
EATING_ASSISTANCE: 0
GROOMING_ASSISTANCE: 0
TRANSPORTATION_ASSISTANCE: 6
TOILETING_ASSISTANCE: 0
LAUNDRY_ASSISTANCE: 6
DRESSING_UB_ASSISTANCE: 0
BATHING_ASSISTANCE: 1
MEAL_PREP_ASSISTANCE: 6
DRESSING_LB_ASSISTANCE: 0
HOUSEKEEPING_ASSISTANCE: 6
ORAL_CARE_ASSISTANCE: 0

## 2019-02-15 PROCEDURE — 665999 HH PPS REVENUE DEBIT

## 2019-02-15 PROCEDURE — 665998 HH PPS REVENUE CREDIT

## 2019-02-16 PROCEDURE — 665998 HH PPS REVENUE CREDIT

## 2019-02-16 PROCEDURE — 665999 HH PPS REVENUE DEBIT

## 2019-02-17 PROCEDURE — 665999 HH PPS REVENUE DEBIT

## 2019-02-17 PROCEDURE — 665998 HH PPS REVENUE CREDIT

## 2019-02-18 PROCEDURE — 665999 HH PPS REVENUE DEBIT

## 2019-02-18 PROCEDURE — 665998 HH PPS REVENUE CREDIT

## 2019-02-19 ENCOUNTER — HOME CARE VISIT (OUTPATIENT)
Dept: HOME HEALTH SERVICES | Facility: HOME HEALTHCARE | Age: 84
End: 2019-02-19
Payer: MEDICARE

## 2019-02-19 VITALS
RESPIRATION RATE: 18 BRPM | TEMPERATURE: 98 F | OXYGEN SATURATION: 98 % | DIASTOLIC BLOOD PRESSURE: 64 MMHG | SYSTOLIC BLOOD PRESSURE: 116 MMHG | HEART RATE: 62 BPM

## 2019-02-19 VITALS
HEART RATE: 62 BPM | TEMPERATURE: 98 F | RESPIRATION RATE: 18 BRPM | OXYGEN SATURATION: 98 % | DIASTOLIC BLOOD PRESSURE: 64 MMHG | SYSTOLIC BLOOD PRESSURE: 116 MMHG

## 2019-02-19 PROCEDURE — 665999 HH PPS REVENUE DEBIT

## 2019-02-19 PROCEDURE — 665998 HH PPS REVENUE CREDIT

## 2019-02-19 PROCEDURE — G0495 RN CARE TRAIN/EDU IN HH: HCPCS

## 2019-02-19 PROCEDURE — G0151 HHCP-SERV OF PT,EA 15 MIN: HCPCS

## 2019-02-19 ASSESSMENT — ENCOUNTER SYMPTOMS
DEBILITATING PAIN: 1
VOMITING: DENIES ANY VOMITING
DIFFICULTY THINKING: 1
NAUSEA: DENIES C/O NAUSEA

## 2019-02-19 ASSESSMENT — ACTIVITIES OF DAILY LIVING (ADL): TRANSPORTATION COMMENTS: VIA W/C W/ A

## 2019-02-20 ENCOUNTER — HOME CARE VISIT (OUTPATIENT)
Dept: HOME HEALTH SERVICES | Facility: HOME HEALTHCARE | Age: 84
End: 2019-02-20
Payer: MEDICARE

## 2019-02-20 PROCEDURE — 665998 HH PPS REVENUE CREDIT

## 2019-02-20 PROCEDURE — 665999 HH PPS REVENUE DEBIT

## 2019-02-21 PROCEDURE — 665999 HH PPS REVENUE DEBIT

## 2019-02-21 PROCEDURE — 665998 HH PPS REVENUE CREDIT

## 2019-02-22 ENCOUNTER — HOME CARE VISIT (OUTPATIENT)
Dept: HOME HEALTH SERVICES | Facility: HOME HEALTHCARE | Age: 84
End: 2019-02-22
Payer: MEDICARE

## 2019-02-22 PROCEDURE — 665999 HH PPS REVENUE DEBIT

## 2019-02-22 PROCEDURE — G0151 HHCP-SERV OF PT,EA 15 MIN: HCPCS

## 2019-02-22 PROCEDURE — 665998 HH PPS REVENUE CREDIT

## 2019-02-23 PROCEDURE — 665998 HH PPS REVENUE CREDIT

## 2019-02-23 PROCEDURE — 665999 HH PPS REVENUE DEBIT

## 2019-02-24 VITALS
RESPIRATION RATE: 17 BRPM | DIASTOLIC BLOOD PRESSURE: 55 MMHG | OXYGEN SATURATION: 97 % | TEMPERATURE: 97.9 F | SYSTOLIC BLOOD PRESSURE: 128 MMHG | HEART RATE: 59 BPM

## 2019-02-24 PROCEDURE — 665998 HH PPS REVENUE CREDIT

## 2019-02-24 PROCEDURE — 665999 HH PPS REVENUE DEBIT

## 2019-02-25 ENCOUNTER — HOME CARE VISIT (OUTPATIENT)
Dept: HOME HEALTH SERVICES | Facility: HOME HEALTHCARE | Age: 84
End: 2019-02-25
Payer: MEDICARE

## 2019-02-25 PROCEDURE — 665998 HH PPS REVENUE CREDIT

## 2019-02-25 PROCEDURE — 665999 HH PPS REVENUE DEBIT

## 2019-02-26 ENCOUNTER — HOME CARE VISIT (OUTPATIENT)
Dept: HOME HEALTH SERVICES | Facility: HOME HEALTHCARE | Age: 84
End: 2019-02-26
Payer: MEDICARE

## 2019-02-26 VITALS
OXYGEN SATURATION: 96 % | HEART RATE: 59 BPM | SYSTOLIC BLOOD PRESSURE: 117 MMHG | TEMPERATURE: 97.9 F | RESPIRATION RATE: 16 BRPM | DIASTOLIC BLOOD PRESSURE: 60 MMHG

## 2019-02-26 VITALS
TEMPERATURE: 98.1 F | SYSTOLIC BLOOD PRESSURE: 118 MMHG | RESPIRATION RATE: 18 BRPM | HEART RATE: 61 BPM | DIASTOLIC BLOOD PRESSURE: 64 MMHG | OXYGEN SATURATION: 96 %

## 2019-02-26 PROCEDURE — 665999 HH PPS REVENUE DEBIT

## 2019-02-26 PROCEDURE — 665998 HH PPS REVENUE CREDIT

## 2019-02-26 PROCEDURE — G0151 HHCP-SERV OF PT,EA 15 MIN: HCPCS

## 2019-02-26 PROCEDURE — G0495 RN CARE TRAIN/EDU IN HH: HCPCS

## 2019-02-26 ASSESSMENT — ENCOUNTER SYMPTOMS
VOMITING: DENIES ANY VOMITING
NAUSEA: DENIES C/O NAUSEA
DIFFICULTY THINKING: 1
DIFFICULTY THINKING: 1
POOR JUDGMENT: 1

## 2019-02-27 ENCOUNTER — HOME CARE VISIT (OUTPATIENT)
Dept: HOME HEALTH SERVICES | Facility: HOME HEALTHCARE | Age: 84
End: 2019-02-27
Payer: MEDICARE

## 2019-02-27 VITALS
HEART RATE: 60 BPM | SYSTOLIC BLOOD PRESSURE: 122 MMHG | TEMPERATURE: 97.5 F | DIASTOLIC BLOOD PRESSURE: 75 MMHG | OXYGEN SATURATION: 95 % | RESPIRATION RATE: 18 BRPM

## 2019-02-27 PROCEDURE — 665999 HH PPS REVENUE DEBIT

## 2019-02-27 PROCEDURE — G0152 HHCP-SERV OF OT,EA 15 MIN: HCPCS

## 2019-02-27 PROCEDURE — 665998 HH PPS REVENUE CREDIT

## 2019-02-27 ASSESSMENT — ACTIVITIES OF DAILY LIVING (ADL)
LAUNDRY_ASSISTANCE: 6
TRANSPORTATION_ASSISTANCE: 6
TOILETING_ASSISTANCE: 5
BATHING_ASSISTANCE: 6
DRESSING_LB_ASSISTANCE: 6
SHOPPING_ASSISTANCE: 6
DRESSING_UB_ASSISTANCE: 6
BATHING_ASSISTIVE_EQUIPMENT_USED: SHOWER CHAIR, GRAB BARS, HHSH
MEAL_PREP_ASSISTANCE: 6
TELEPHONE_ASSISTANCE: 6
HOUSEKEEPING_ASSISTANCE: 6

## 2019-02-27 ASSESSMENT — ENCOUNTER SYMPTOMS: DIFFICULTY THINKING: 1

## 2019-02-28 PROCEDURE — 665999 HH PPS REVENUE DEBIT

## 2019-02-28 PROCEDURE — 665998 HH PPS REVENUE CREDIT

## 2019-03-01 ENCOUNTER — HOME CARE VISIT (OUTPATIENT)
Dept: HOME HEALTH SERVICES | Facility: HOME HEALTHCARE | Age: 84
End: 2019-03-01
Payer: MEDICARE

## 2019-03-01 VITALS
HEART RATE: 60 BPM | OXYGEN SATURATION: 98 % | TEMPERATURE: 98 F | RESPIRATION RATE: 16 BRPM | DIASTOLIC BLOOD PRESSURE: 60 MMHG | SYSTOLIC BLOOD PRESSURE: 119 MMHG

## 2019-03-01 PROCEDURE — 665999 HH PPS REVENUE DEBIT

## 2019-03-01 PROCEDURE — 665998 HH PPS REVENUE CREDIT

## 2019-03-01 PROCEDURE — G0151 HHCP-SERV OF PT,EA 15 MIN: HCPCS

## 2019-03-01 ASSESSMENT — ENCOUNTER SYMPTOMS: DIFFICULTY THINKING: 1

## 2019-03-01 ASSESSMENT — ACTIVITIES OF DAILY LIVING (ADL): TRANSPORTATION COMMENTS: VIA W/C W/ A

## 2019-03-02 PROCEDURE — 665999 HH PPS REVENUE DEBIT

## 2019-03-02 PROCEDURE — 665998 HH PPS REVENUE CREDIT

## 2019-03-03 PROCEDURE — 665998 HH PPS REVENUE CREDIT

## 2019-03-03 PROCEDURE — 665999 HH PPS REVENUE DEBIT

## 2019-03-04 ENCOUNTER — HOME CARE VISIT (OUTPATIENT)
Dept: HOME HEALTH SERVICES | Facility: HOME HEALTHCARE | Age: 84
End: 2019-03-04
Payer: MEDICARE

## 2019-03-04 PROCEDURE — 665999 HH PPS REVENUE DEBIT

## 2019-03-04 PROCEDURE — 665998 HH PPS REVENUE CREDIT

## 2019-03-05 ENCOUNTER — HOME CARE VISIT (OUTPATIENT)
Dept: HOME HEALTH SERVICES | Facility: HOME HEALTHCARE | Age: 84
End: 2019-03-05
Payer: MEDICARE

## 2019-03-05 VITALS
SYSTOLIC BLOOD PRESSURE: 120 MMHG | RESPIRATION RATE: 18 BRPM | HEART RATE: 60 BPM | OXYGEN SATURATION: 95 % | DIASTOLIC BLOOD PRESSURE: 72 MMHG | TEMPERATURE: 97.7 F

## 2019-03-05 VITALS
DIASTOLIC BLOOD PRESSURE: 60 MMHG | SYSTOLIC BLOOD PRESSURE: 117 MMHG | HEART RATE: 64 BPM | OXYGEN SATURATION: 95 % | RESPIRATION RATE: 17 BRPM | TEMPERATURE: 98.2 F

## 2019-03-05 PROCEDURE — G0151 HHCP-SERV OF PT,EA 15 MIN: HCPCS

## 2019-03-05 PROCEDURE — G0495 RN CARE TRAIN/EDU IN HH: HCPCS

## 2019-03-05 PROCEDURE — 665998 HH PPS REVENUE CREDIT

## 2019-03-05 PROCEDURE — 665999 HH PPS REVENUE DEBIT

## 2019-03-05 ASSESSMENT — ENCOUNTER SYMPTOMS
DEBILITATING PAIN: 1
DIFFICULTY THINKING: 1
VOMITING: DENIES ANY VOMITING
NAUSEA: DENIES C/O NAUSEA

## 2019-03-05 ASSESSMENT — ACTIVITIES OF DAILY LIVING (ADL)
HOUSEKEEPING_ASSISTANCE: 6
DRESSING_LB_ASSISTANCE: 5
TOILETING_ASSISTANCE: 5
TRANSPORTATION_ASSISTANCE: 6
LAUNDRY_ASSISTANCE: 6
ORAL_CARE_ASSISTANCE: 2
DRESSING_UB_ASSISTANCE: 5
SHOPPING_ASSISTANCE: 6
GROOMING_ASSISTANCE: 2
EATING_ASSISTANCE: 1
TELEPHONE_ASSISTANCE: 4
MEAL_PREP_ASSISTANCE: 6
BATHING_ASSISTANCE: 5

## 2019-03-06 ENCOUNTER — HOME CARE VISIT (OUTPATIENT)
Dept: HOME HEALTH SERVICES | Facility: HOME HEALTHCARE | Age: 84
End: 2019-03-06
Payer: MEDICARE

## 2019-03-06 PROCEDURE — G0152 HHCP-SERV OF OT,EA 15 MIN: HCPCS

## 2019-03-06 PROCEDURE — 665999 HH PPS REVENUE DEBIT

## 2019-03-06 PROCEDURE — 665998 HH PPS REVENUE CREDIT

## 2019-03-07 ENCOUNTER — HOME CARE VISIT (OUTPATIENT)
Dept: HOME HEALTH SERVICES | Facility: HOME HEALTHCARE | Age: 84
End: 2019-03-07
Payer: MEDICARE

## 2019-03-07 VITALS
DIASTOLIC BLOOD PRESSURE: 53 MMHG | OXYGEN SATURATION: 96 % | HEART RATE: 60 BPM | RESPIRATION RATE: 17 BRPM | TEMPERATURE: 98 F | SYSTOLIC BLOOD PRESSURE: 116 MMHG

## 2019-03-07 VITALS
TEMPERATURE: 98.1 F | DIASTOLIC BLOOD PRESSURE: 60 MMHG | HEART RATE: 64 BPM | RESPIRATION RATE: 18 BRPM | SYSTOLIC BLOOD PRESSURE: 107 MMHG | OXYGEN SATURATION: 91 %

## 2019-03-07 PROCEDURE — 665999 HH PPS REVENUE DEBIT

## 2019-03-07 PROCEDURE — G0151 HHCP-SERV OF PT,EA 15 MIN: HCPCS

## 2019-03-07 PROCEDURE — 665998 HH PPS REVENUE CREDIT

## 2019-03-07 ASSESSMENT — ENCOUNTER SYMPTOMS: DIFFICULTY THINKING: 1

## 2019-03-08 PROCEDURE — 665998 HH PPS REVENUE CREDIT

## 2019-03-08 PROCEDURE — 665999 HH PPS REVENUE DEBIT

## 2019-03-09 PROCEDURE — 665998 HH PPS REVENUE CREDIT

## 2019-03-09 PROCEDURE — 665999 HH PPS REVENUE DEBIT

## 2019-03-10 PROCEDURE — 665999 HH PPS REVENUE DEBIT

## 2019-03-10 PROCEDURE — 665998 HH PPS REVENUE CREDIT

## 2019-03-11 ENCOUNTER — HOME CARE VISIT (OUTPATIENT)
Dept: HOME HEALTH SERVICES | Facility: HOME HEALTHCARE | Age: 84
End: 2019-03-11
Payer: MEDICARE

## 2019-03-11 PROCEDURE — 665999 HH PPS REVENUE DEBIT

## 2019-03-11 PROCEDURE — G0152 HHCP-SERV OF OT,EA 15 MIN: HCPCS

## 2019-03-11 PROCEDURE — 665998 HH PPS REVENUE CREDIT

## 2019-03-12 VITALS
TEMPERATURE: 98.5 F | SYSTOLIC BLOOD PRESSURE: 112 MMHG | OXYGEN SATURATION: 94 % | RESPIRATION RATE: 18 BRPM | HEART RATE: 59 BPM | DIASTOLIC BLOOD PRESSURE: 62 MMHG

## 2019-03-12 PROCEDURE — 665999 HH PPS REVENUE DEBIT

## 2019-03-12 PROCEDURE — 665998 HH PPS REVENUE CREDIT

## 2019-03-13 PROCEDURE — 665999 HH PPS REVENUE DEBIT

## 2019-03-13 PROCEDURE — 665998 HH PPS REVENUE CREDIT

## 2019-03-14 PROCEDURE — 665998 HH PPS REVENUE CREDIT

## 2019-03-14 PROCEDURE — 665999 HH PPS REVENUE DEBIT

## 2019-03-15 PROCEDURE — 665999 HH PPS REVENUE DEBIT

## 2019-03-15 PROCEDURE — 665998 HH PPS REVENUE CREDIT

## 2019-03-16 PROCEDURE — 665998 HH PPS REVENUE CREDIT

## 2019-03-16 PROCEDURE — 665999 HH PPS REVENUE DEBIT

## 2019-03-17 PROCEDURE — 665998 HH PPS REVENUE CREDIT

## 2019-03-17 PROCEDURE — 665999 HH PPS REVENUE DEBIT

## 2019-03-18 PROCEDURE — 665999 HH PPS REVENUE DEBIT

## 2019-03-18 PROCEDURE — 665998 HH PPS REVENUE CREDIT

## 2019-03-19 PROCEDURE — 665998 HH PPS REVENUE CREDIT

## 2019-03-19 PROCEDURE — 665999 HH PPS REVENUE DEBIT

## 2019-03-20 PROCEDURE — 665999 HH PPS REVENUE DEBIT

## 2019-03-20 PROCEDURE — 665998 HH PPS REVENUE CREDIT

## 2019-03-21 PROCEDURE — 665998 HH PPS REVENUE CREDIT

## 2019-03-21 PROCEDURE — 665999 HH PPS REVENUE DEBIT

## 2019-03-22 PROCEDURE — 665998 HH PPS REVENUE CREDIT

## 2019-03-22 PROCEDURE — 665999 HH PPS REVENUE DEBIT

## 2019-03-23 PROCEDURE — 665999 HH PPS REVENUE DEBIT

## 2019-03-23 PROCEDURE — 665998 HH PPS REVENUE CREDIT

## 2019-03-24 PROCEDURE — 665998 HH PPS REVENUE CREDIT

## 2019-03-24 PROCEDURE — 665999 HH PPS REVENUE DEBIT

## 2019-03-25 ENCOUNTER — HOME CARE VISIT (OUTPATIENT)
Dept: HOME HEALTH SERVICES | Facility: HOME HEALTHCARE | Age: 84
End: 2019-03-25
Payer: MEDICARE

## 2019-03-25 PROCEDURE — 665998 HH PPS REVENUE CREDIT

## 2019-03-25 PROCEDURE — 665999 HH PPS REVENUE DEBIT

## 2019-03-26 ENCOUNTER — HOME CARE VISIT (OUTPATIENT)
Dept: HOME HEALTH SERVICES | Facility: HOME HEALTHCARE | Age: 84
End: 2019-03-26
Payer: MEDICARE

## 2019-03-26 PROCEDURE — 665999 HH PPS REVENUE DEBIT

## 2019-03-26 PROCEDURE — 665998 HH PPS REVENUE CREDIT

## 2019-03-27 PROCEDURE — 665998 HH PPS REVENUE CREDIT

## 2019-03-27 PROCEDURE — 665999 HH PPS REVENUE DEBIT

## 2019-03-28 PROCEDURE — 665998 HH PPS REVENUE CREDIT

## 2019-03-28 PROCEDURE — 665999 HH PPS REVENUE DEBIT

## 2019-03-29 ENCOUNTER — HOME CARE VISIT (OUTPATIENT)
Dept: HOME HEALTH SERVICES | Facility: HOME HEALTHCARE | Age: 84
End: 2019-03-29
Payer: MEDICARE

## 2019-03-29 PROCEDURE — G0152 HHCP-SERV OF OT,EA 15 MIN: HCPCS

## 2019-03-29 PROCEDURE — 665998 HH PPS REVENUE CREDIT

## 2019-03-29 PROCEDURE — 665999 HH PPS REVENUE DEBIT

## 2019-03-30 PROCEDURE — 665998 HH PPS REVENUE CREDIT

## 2019-03-30 PROCEDURE — 665999 HH PPS REVENUE DEBIT

## 2019-03-31 PROCEDURE — 665999 HH PPS REVENUE DEBIT

## 2019-03-31 PROCEDURE — 665998 HH PPS REVENUE CREDIT

## 2019-04-01 VITALS
OXYGEN SATURATION: 93 % | HEART RATE: 60 BPM | TEMPERATURE: 98.8 F | SYSTOLIC BLOOD PRESSURE: 112 MMHG | RESPIRATION RATE: 18 BRPM | DIASTOLIC BLOOD PRESSURE: 75 MMHG

## 2019-04-01 PROCEDURE — 665999 HH PPS REVENUE DEBIT

## 2019-04-01 PROCEDURE — 665998 HH PPS REVENUE CREDIT

## 2019-04-01 ASSESSMENT — ACTIVITIES OF DAILY LIVING (ADL)
EATING_ASSISTANCE: 0
MEAL_PREP_ASSISTANCE: 6
DRESSING_LB_ASSISTANCE: 4
SHOPPING_ASSISTANCE: 6
GROOMING_ASSISTANCE: 0
LAUNDRY_ASSISTANCE: 6
HOUSEKEEPING_ASSISTANCE: 6
TOILETING_ASSISTANCE: 4
BATHING_ASSISTANCE: 5
TELEPHONE_ASSISTANCE: 6
DRESSING_UB_ASSISTANCE: 2
TRANSPORTATION_ASSISTANCE: 6
ORAL_CARE_ASSISTANCE: 0

## 2019-04-02 ENCOUNTER — HOME CARE VISIT (OUTPATIENT)
Dept: HOME HEALTH SERVICES | Facility: HOME HEALTHCARE | Age: 84
End: 2019-04-02
Payer: MEDICARE

## 2019-04-02 VITALS
TEMPERATURE: 98.2 F | SYSTOLIC BLOOD PRESSURE: 116 MMHG | HEART RATE: 58 BPM | RESPIRATION RATE: 18 BRPM | OXYGEN SATURATION: 93 % | DIASTOLIC BLOOD PRESSURE: 78 MMHG

## 2019-04-02 PROCEDURE — G0152 HHCP-SERV OF OT,EA 15 MIN: HCPCS

## 2019-04-02 PROCEDURE — 665999 HH PPS REVENUE DEBIT

## 2019-04-02 PROCEDURE — 665998 HH PPS REVENUE CREDIT

## 2019-04-03 PROCEDURE — 665999 HH PPS REVENUE DEBIT

## 2019-04-03 PROCEDURE — 665998 HH PPS REVENUE CREDIT

## 2019-04-04 ENCOUNTER — HOME CARE VISIT (OUTPATIENT)
Dept: HOME HEALTH SERVICES | Facility: HOME HEALTHCARE | Age: 84
End: 2019-04-04
Payer: MEDICARE

## 2019-04-04 VITALS
SYSTOLIC BLOOD PRESSURE: 110 MMHG | TEMPERATURE: 98.5 F | OXYGEN SATURATION: 95 % | HEART RATE: 64 BPM | DIASTOLIC BLOOD PRESSURE: 62 MMHG | RESPIRATION RATE: 18 BRPM

## 2019-04-04 PROCEDURE — 665997 HH PPS REVENUE ADJ

## 2019-04-04 PROCEDURE — 665999 HH PPS REVENUE DEBIT

## 2019-04-04 PROCEDURE — 665998 HH PPS REVENUE CREDIT

## 2019-04-04 PROCEDURE — G0152 HHCP-SERV OF OT,EA 15 MIN: HCPCS

## 2019-04-04 ASSESSMENT — ENCOUNTER SYMPTOMS
DIFFICULTY THINKING: 1
POOR JUDGMENT: 1

## 2019-04-04 ASSESSMENT — ACTIVITIES OF DAILY LIVING (ADL): HOME_HEALTH_OASIS: 01

## 2019-04-05 PROCEDURE — 665999 HH PPS REVENUE DEBIT

## 2019-04-05 PROCEDURE — 665998 HH PPS REVENUE CREDIT

## 2019-04-06 PROCEDURE — 665998 HH PPS REVENUE CREDIT

## 2019-04-06 PROCEDURE — 665999 HH PPS REVENUE DEBIT

## 2019-04-07 PROCEDURE — 665998 HH PPS REVENUE CREDIT

## 2019-04-07 PROCEDURE — 665997 HH PPS REVENUE ADJ

## 2019-04-07 PROCEDURE — 665999 HH PPS REVENUE DEBIT

## 2019-04-09 ASSESSMENT — ACTIVITIES OF DAILY LIVING (ADL): OASIS_M1830: 02

## 2019-05-01 ENCOUNTER — APPOINTMENT (RX ONLY)
Dept: URBAN - METROPOLITAN AREA CLINIC 4 | Facility: CLINIC | Age: 84
Setting detail: DERMATOLOGY
End: 2019-05-01

## 2019-06-04 ENCOUNTER — HOSPITAL ENCOUNTER (OUTPATIENT)
Facility: MEDICAL CENTER | Age: 84
End: 2019-06-04
Attending: NURSE PRACTITIONER
Payer: MEDICARE

## 2019-06-04 LAB
APPEARANCE UR: CLEAR
BACTERIA #/AREA URNS HPF: ABNORMAL /HPF
BILIRUB UR QL STRIP.AUTO: NEGATIVE
COLOR UR: YELLOW
EPI CELLS #/AREA URNS HPF: NEGATIVE /HPF
GLUCOSE UR STRIP.AUTO-MCNC: NEGATIVE MG/DL
HYALINE CASTS #/AREA URNS LPF: ABNORMAL /LPF
KETONES UR STRIP.AUTO-MCNC: NEGATIVE MG/DL
LEUKOCYTE ESTERASE UR QL STRIP.AUTO: ABNORMAL
MICRO URNS: ABNORMAL
NITRITE UR QL STRIP.AUTO: NEGATIVE
PH UR STRIP.AUTO: 5.5 [PH]
PROT UR QL STRIP: NEGATIVE MG/DL
RBC # URNS HPF: ABNORMAL /HPF
RBC UR QL AUTO: NEGATIVE
SP GR UR STRIP.AUTO: 1.01
UROBILINOGEN UR STRIP.AUTO-MCNC: 0.2 MG/DL
WBC #/AREA URNS HPF: ABNORMAL /HPF
YEAST BUDDING URNS QL: PRESENT /HPF

## 2019-06-04 PROCEDURE — 81001 URINALYSIS AUTO W/SCOPE: CPT

## 2019-06-04 PROCEDURE — 87186 SC STD MICRODIL/AGAR DIL: CPT

## 2019-06-04 PROCEDURE — 87086 URINE CULTURE/COLONY COUNT: CPT

## 2019-06-04 PROCEDURE — 87077 CULTURE AEROBIC IDENTIFY: CPT

## 2019-06-06 LAB
BACTERIA UR CULT: ABNORMAL
BACTERIA UR CULT: ABNORMAL
SIGNIFICANT IND 70042: ABNORMAL
SITE SITE: ABNORMAL
SOURCE SOURCE: ABNORMAL

## 2019-07-01 ENCOUNTER — NON-PROVIDER VISIT (OUTPATIENT)
Dept: URGENT CARE | Facility: CLINIC | Age: 84
End: 2019-07-01

## 2019-07-01 DIAGNOSIS — Z11.1 ENCOUNTER FOR PPD TEST: ICD-10-CM

## 2019-07-01 PROCEDURE — 86580 TB INTRADERMAL TEST: CPT | Performed by: FAMILY MEDICINE

## 2019-07-03 ENCOUNTER — NON-PROVIDER VISIT (OUTPATIENT)
Dept: URGENT CARE | Facility: CLINIC | Age: 84
End: 2019-07-03
Payer: MEDICARE

## 2019-07-03 LAB — TB WHEAL 3D P 5 TU DIAM: NORMAL MM

## 2019-12-30 ENCOUNTER — HOSPITAL ENCOUNTER (OUTPATIENT)
Facility: MEDICAL CENTER | Age: 84
End: 2019-12-30
Attending: NURSE PRACTITIONER
Payer: MEDICARE

## 2019-12-30 LAB
APPEARANCE UR: ABNORMAL
BACTERIA #/AREA URNS HPF: ABNORMAL /HPF
BILIRUB UR QL STRIP.AUTO: NEGATIVE
CAOX CRY #/AREA URNS HPF: ABNORMAL /HPF
COLOR UR: YELLOW
EPI CELLS #/AREA URNS HPF: NEGATIVE /HPF
GLUCOSE UR STRIP.AUTO-MCNC: NEGATIVE MG/DL
HYALINE CASTS #/AREA URNS LPF: ABNORMAL /LPF
KETONES UR STRIP.AUTO-MCNC: NEGATIVE MG/DL
LEUKOCYTE ESTERASE UR QL STRIP.AUTO: ABNORMAL
MICRO URNS: ABNORMAL
NITRITE UR QL STRIP.AUTO: POSITIVE
PH UR STRIP.AUTO: 5.5 [PH] (ref 5–8)
PROT UR QL STRIP: NEGATIVE MG/DL
RBC # URNS HPF: ABNORMAL /HPF
RBC UR QL AUTO: ABNORMAL
SP GR UR STRIP.AUTO: 1.02
UROBILINOGEN UR STRIP.AUTO-MCNC: 0.2 MG/DL
WBC #/AREA URNS HPF: ABNORMAL /HPF

## 2019-12-30 PROCEDURE — 87077 CULTURE AEROBIC IDENTIFY: CPT

## 2019-12-30 PROCEDURE — 87086 URINE CULTURE/COLONY COUNT: CPT

## 2019-12-30 PROCEDURE — 87186 SC STD MICRODIL/AGAR DIL: CPT

## 2019-12-30 PROCEDURE — 81001 URINALYSIS AUTO W/SCOPE: CPT

## 2020-01-28 ENCOUNTER — HOSPITAL ENCOUNTER (OUTPATIENT)
Facility: MEDICAL CENTER | Age: 85
End: 2020-01-28
Attending: NURSE PRACTITIONER
Payer: MEDICARE

## 2020-01-28 LAB
APPEARANCE UR: CLEAR
BACTERIA #/AREA URNS HPF: ABNORMAL /HPF
BILIRUB UR QL STRIP.AUTO: NEGATIVE
COLOR UR: YELLOW
EPI CELLS #/AREA URNS HPF: NEGATIVE /HPF
GLUCOSE UR STRIP.AUTO-MCNC: NEGATIVE MG/DL
HYALINE CASTS #/AREA URNS LPF: ABNORMAL /LPF
KETONES UR STRIP.AUTO-MCNC: NEGATIVE MG/DL
LEUKOCYTE ESTERASE UR QL STRIP.AUTO: ABNORMAL
MICRO URNS: ABNORMAL
NITRITE UR QL STRIP.AUTO: NEGATIVE
PH UR STRIP.AUTO: 6 [PH] (ref 5–8)
PROT UR QL STRIP: NEGATIVE MG/DL
RBC # URNS HPF: ABNORMAL /HPF
RBC UR QL AUTO: NEGATIVE
SP GR UR STRIP.AUTO: 1.02
UROBILINOGEN UR STRIP.AUTO-MCNC: 0.2 MG/DL
WBC #/AREA URNS HPF: ABNORMAL /HPF

## 2020-01-28 PROCEDURE — 87086 URINE CULTURE/COLONY COUNT: CPT

## 2020-01-28 PROCEDURE — 81001 URINALYSIS AUTO W/SCOPE: CPT

## 2020-01-28 PROCEDURE — 87186 SC STD MICRODIL/AGAR DIL: CPT

## 2020-01-28 PROCEDURE — 87077 CULTURE AEROBIC IDENTIFY: CPT

## 2020-01-30 ENCOUNTER — HOSPITAL ENCOUNTER (EMERGENCY)
Facility: MEDICAL CENTER | Age: 85
End: 2020-01-30
Attending: EMERGENCY MEDICINE
Payer: MEDICARE

## 2020-01-30 VITALS
DIASTOLIC BLOOD PRESSURE: 74 MMHG | HEART RATE: 75 BPM | TEMPERATURE: 99 F | OXYGEN SATURATION: 98 % | RESPIRATION RATE: 18 BRPM | WEIGHT: 130 LBS | SYSTOLIC BLOOD PRESSURE: 160 MMHG | HEIGHT: 62 IN | BODY MASS INDEX: 23.92 KG/M2

## 2020-01-30 DIAGNOSIS — N39.0 ACUTE UTI: ICD-10-CM

## 2020-01-30 LAB
ALBUMIN SERPL BCP-MCNC: 3.7 G/DL (ref 3.2–4.9)
ALBUMIN/GLOB SERPL: 1.1 G/DL
ALP SERPL-CCNC: 64 U/L (ref 30–99)
ALT SERPL-CCNC: 9 U/L (ref 2–50)
ANION GAP SERPL CALC-SCNC: 10 MMOL/L (ref 0–11.9)
APPEARANCE UR: ABNORMAL
AST SERPL-CCNC: 18 U/L (ref 12–45)
BACTERIA #/AREA URNS HPF: ABNORMAL /HPF
BACTERIA UR CULT: ABNORMAL
BACTERIA UR CULT: ABNORMAL
BASOPHILS # BLD AUTO: 0.3 % (ref 0–1.8)
BASOPHILS # BLD: 0.02 K/UL (ref 0–0.12)
BILIRUB SERPL-MCNC: 0.8 MG/DL (ref 0.1–1.5)
BILIRUB UR QL STRIP.AUTO: NEGATIVE
BUN SERPL-MCNC: 24 MG/DL (ref 8–22)
CALCIUM SERPL-MCNC: 9.8 MG/DL (ref 8.5–10.5)
CHLORIDE SERPL-SCNC: 110 MMOL/L (ref 96–112)
CO2 SERPL-SCNC: 20 MMOL/L (ref 20–33)
COLOR UR: YELLOW
CREAT SERPL-MCNC: 1.37 MG/DL (ref 0.5–1.4)
EOSINOPHIL # BLD AUTO: 0.16 K/UL (ref 0–0.51)
EOSINOPHIL NFR BLD: 2.3 % (ref 0–6.9)
EPI CELLS #/AREA URNS HPF: ABNORMAL /HPF
ERYTHROCYTE [DISTWIDTH] IN BLOOD BY AUTOMATED COUNT: 43.9 FL (ref 35.9–50)
GLOBULIN SER CALC-MCNC: 3.5 G/DL (ref 1.9–3.5)
GLUCOSE SERPL-MCNC: 102 MG/DL (ref 65–99)
GLUCOSE UR STRIP.AUTO-MCNC: NEGATIVE MG/DL
HCT VFR BLD AUTO: 38.7 % (ref 37–47)
HGB BLD-MCNC: 13 G/DL (ref 12–16)
HYALINE CASTS #/AREA URNS LPF: ABNORMAL /LPF
IMM GRANULOCYTES # BLD AUTO: 0.02 K/UL (ref 0–0.11)
IMM GRANULOCYTES NFR BLD AUTO: 0.3 % (ref 0–0.9)
KETONES UR STRIP.AUTO-MCNC: ABNORMAL MG/DL
LEUKOCYTE ESTERASE UR QL STRIP.AUTO: ABNORMAL
LYMPHOCYTES # BLD AUTO: 3.14 K/UL (ref 1–4.8)
LYMPHOCYTES NFR BLD: 45 % (ref 22–41)
MCH RBC QN AUTO: 30.5 PG (ref 27–33)
MCHC RBC AUTO-ENTMCNC: 33.6 G/DL (ref 33.6–35)
MCV RBC AUTO: 90.8 FL (ref 81.4–97.8)
MICRO URNS: ABNORMAL
MONOCYTES # BLD AUTO: 0.54 K/UL (ref 0–0.85)
MONOCYTES NFR BLD AUTO: 7.7 % (ref 0–13.4)
NEUTROPHILS # BLD AUTO: 3.1 K/UL (ref 2–7.15)
NEUTROPHILS NFR BLD: 44.4 % (ref 44–72)
NITRITE UR QL STRIP.AUTO: NEGATIVE
NRBC # BLD AUTO: 0 K/UL
NRBC BLD-RTO: 0 /100 WBC
PH UR STRIP.AUTO: 5 [PH] (ref 5–8)
PLATELET # BLD AUTO: 184 K/UL (ref 164–446)
PMV BLD AUTO: 11 FL (ref 9–12.9)
POTASSIUM SERPL-SCNC: 3.9 MMOL/L (ref 3.6–5.5)
PROT SERPL-MCNC: 7.2 G/DL (ref 6–8.2)
PROT UR QL STRIP: NEGATIVE MG/DL
RBC # BLD AUTO: 4.26 M/UL (ref 4.2–5.4)
RBC # URNS HPF: ABNORMAL /HPF
RBC UR QL AUTO: ABNORMAL
SIGNIFICANT IND 70042: ABNORMAL
SITE SITE: ABNORMAL
SODIUM SERPL-SCNC: 140 MMOL/L (ref 135–145)
SOURCE SOURCE: ABNORMAL
SP GR UR STRIP.AUTO: 1.02
UROBILINOGEN UR STRIP.AUTO-MCNC: 0.2 MG/DL
WBC # BLD AUTO: 7 K/UL (ref 4.8–10.8)
WBC #/AREA URNS HPF: ABNORMAL /HPF

## 2020-01-30 PROCEDURE — 87077 CULTURE AEROBIC IDENTIFY: CPT

## 2020-01-30 PROCEDURE — 36415 COLL VENOUS BLD VENIPUNCTURE: CPT

## 2020-01-30 PROCEDURE — 87186 SC STD MICRODIL/AGAR DIL: CPT

## 2020-01-30 PROCEDURE — 81001 URINALYSIS AUTO W/SCOPE: CPT

## 2020-01-30 PROCEDURE — 85025 COMPLETE CBC W/AUTO DIFF WBC: CPT

## 2020-01-30 PROCEDURE — 87086 URINE CULTURE/COLONY COUNT: CPT

## 2020-01-30 PROCEDURE — 700102 HCHG RX REV CODE 250 W/ 637 OVERRIDE(OP): Performed by: EMERGENCY MEDICINE

## 2020-01-30 PROCEDURE — 99285 EMERGENCY DEPT VISIT HI MDM: CPT

## 2020-01-30 PROCEDURE — A9270 NON-COVERED ITEM OR SERVICE: HCPCS | Performed by: EMERGENCY MEDICINE

## 2020-01-30 PROCEDURE — 80053 COMPREHEN METABOLIC PANEL: CPT

## 2020-01-30 RX ORDER — SULFAMETHOXAZOLE AND TRIMETHOPRIM 800; 160 MG/1; MG/1
1 TABLET ORAL ONCE
Status: COMPLETED | OUTPATIENT
Start: 2020-01-30 | End: 2020-01-30

## 2020-01-30 RX ORDER — SULFAMETHOXAZOLE AND TRIMETHOPRIM 800; 160 MG/1; MG/1
1 TABLET ORAL 2 TIMES DAILY
Qty: 14 TAB | Refills: 0 | Status: SHIPPED | OUTPATIENT
Start: 2020-01-30 | End: 2020-01-30 | Stop reason: SDUPTHER

## 2020-01-30 RX ORDER — CIPROFLOXACIN 500 MG/1
500 TABLET, FILM COATED ORAL 2 TIMES DAILY
COMMUNITY
Start: 2020-01-02 | End: 2020-07-09

## 2020-01-30 RX ORDER — SULFAMETHOXAZOLE AND TRIMETHOPRIM 800; 160 MG/1; MG/1
1 TABLET ORAL 2 TIMES DAILY
Qty: 14 TAB | Refills: 0 | Status: SHIPPED | OUTPATIENT
Start: 2020-01-30 | End: 2020-02-06

## 2020-01-30 RX ADMIN — SULFAMETHOXAZOLE AND TRIMETHOPRIM 1 TABLET: 800; 160 TABLET ORAL at 20:09

## 2020-01-30 ASSESSMENT — PAIN SCALES - WONG BAKER: WONGBAKER_NUMERICALRESPONSE: DOESN'T HURT AT ALL

## 2020-01-31 NOTE — ED PROVIDER NOTES
ED Provider Note    Scribed for Elisabet Fam M.D. by Christen Sousa. 1/30/2020  5:42 PM    Primary care provider: Denver J Miller, M.D.  Means of arrival: [place]  History obtained from: Daughter  History limited by: None     CHIEF COMPLAINT  Chief Complaint   Patient presents with   • UTI     Pt BIB [facility] from [facility] for MRSA in urine. Pt. diagnosed with UTI in december, on antibiotics, when retested MRSA positive in urine. Denies urinary symptoms.        HPI  Felicity Dominguez is a 89 y.o. female with a history of MRSA who presents to the Emergency Department via ambulance for a UTI onset prior to arrival. The daughter states that the patient currently lives at the [facility] and was diagnosed with a UTI in December. The patient was placed on antibiotics for her UTI and when she performed another urine test, it came back as MRSA positive. The daughter says that the patient does not like to wipe or change her panty liners, and that this may be the cause of her reoccurring symptoms. No alleviating or exacerbating factors were noted. Patient has an associated fever, but denies rhinorrhea or cough. The patient adds that she has a pace maker.     REVIEW OF SYSTEMS  HEENT:  No rhinorrhea  PULMONARY: No cough  : Positive for UTI   Endocrine: Positive for fever    See history of present illness. All other systems are negative. C.    PAST MEDICAL HISTORY   has a past medical history of Arthritis, Hypertension, Incontinence, and Osteopenia.    SURGICAL HISTORY   has a past surgical history that includes inguinal hernia repair; abdominal hysterectomy total; appendectomy; cholecystectomy; hip arthroplasty total (Left); other cardiac surgery; and pacemaker insertion.    SOCIAL HISTORY  Social History     Tobacco Use   • Smoking status: Never Smoker   Substance Use Topics   • Alcohol use: Yes     Comment: occasionally   • Drug use: No      Social History     Substance and Sexual Activity   Drug  "Use No       FAMILY HISTORY  History reviewed. No pertinent family history.    CURRENT MEDICATIONS  Home Medications     Reviewed by Eriberto Harris (Pharmacy Tech) on 01/30/20 at 1841  Med List Status: Complete   Medication Last Dose Status   alendronate (FOSAMAX) 70 MG Tab 1/30/2020 Active   atenolol (TENORMIN) 25 MG Tab 1/30/2020 Active   Calcium Carbonate-Vitamin D (CALCIUM 600 + D) 600-400 MG-UNIT Tab 1/30/2020 Active   ciprofloxacin (CIPRO) 500 MG Tab 1/9/2020 Active   furosemide (LASIX) 20 MG Tab 1/30/2020 Active   levothyroxine (SYNTHROID) 50 MCG Tab 1/30/2020 Active   potassium Chloride ER (K-TAB) 20 MEQ Tab CR tablet 1/30/2020 Active   Probiotic Product (PROBIOTIC PO) 1/30/2020 Active                ALLERGIES  Allergies   Allergen Reactions   • Fluoride Hives and Rash     Gets mouth sores uses flouride free toothpastes.        PHYSICAL EXAM  VITAL SIGNS: BP (!) 167/96   Pulse 60   Temp 37.2 °C (99 °F) (Oral)   Resp 19   Ht 1.575 m (5' 2\")   Wt 59 kg (130 lb)   SpO2 97%   BMI 23.78 kg/m²     Constitutional: Well developed, Well nourished, No acute distress, Non-toxic appearance.   HEENT: Normocephalic, Atraumatic,  external ears normal, pharynx pink,  Mucous  Membranes moist, No rhinorrhea or mucosal edema  Eyes: PERRL, EOMI, Conjunctiva normal, No discharge.   Neck: Normal range of motion, No tenderness, Supple, No stridor.   Lymphatic: No lymphadenopathy    Cardiovascular: Bradycardic, Regular Rhythm, No murmurs,  rubs, or gallops.   Thorax & Lungs: Lungs clear to auscultation bilaterally, No respiratory distress, No wheezes, rhales or rhonchi, No chest wall tenderness.   Abdomen: Bowel sounds normal, Soft, non tender, non distended,  No pulsatile masses., no rebound guarding or peritoneal signs.   Skin: Warm, Dry, No erythema, No rash,   Back:  No CVA tenderness,  No spinal tenderness, bony crepitance, step offs, or instability.   Neurologic: Alert & oriented x 3, Normal motor function, " Normal sensory function, No focal deficits noted. Normal reflexes. Normal Cranial Nerves.  Extremities: Equal, intact distal pulses, No cyanosis, clubbing or edema,  No tenderness.   Musculoskeletal: Good range of motion in all major joints. No tenderness to palpation or major deformities noted.       DIAGNOSTIC STUDIES / PROCEDURES    LABS  Results for orders placed or performed during the hospital encounter of 01/30/20   URINALYSIS,CULTURE IF INDICATED   Result Value Ref Range    Color Yellow     Character Cloudy (A)     Specific Gravity 1.021 <1.035    Ph 5.0 5.0 - 8.0    Glucose Negative Negative mg/dL    Ketones Trace (A) Negative mg/dL    Protein Negative Negative mg/dL    Bilirubin Negative Negative    Urobilinogen, Urine 0.2 Negative    Nitrite Negative Negative    Leukocyte Esterase Moderate (A) Negative    Occult Blood Small (A) Negative    Micro Urine Req Microscopic    CBC WITH DIFFERENTIAL   Result Value Ref Range    WBC 7.0 4.8 - 10.8 K/uL    RBC 4.26 4.20 - 5.40 M/uL    Hemoglobin 13.0 12.0 - 16.0 g/dL    Hematocrit 38.7 37.0 - 47.0 %    MCV 90.8 81.4 - 97.8 fL    MCH 30.5 27.0 - 33.0 pg    MCHC 33.6 33.6 - 35.0 g/dL    RDW 43.9 35.9 - 50.0 fL    Platelet Count 184 164 - 446 K/uL    MPV 11.0 9.0 - 12.9 fL    Neutrophils-Polys 44.40 44.00 - 72.00 %    Lymphocytes 45.00 (H) 22.00 - 41.00 %    Monocytes 7.70 0.00 - 13.40 %    Eosinophils 2.30 0.00 - 6.90 %    Basophils 0.30 0.00 - 1.80 %    Immature Granulocytes 0.30 0.00 - 0.90 %    Nucleated RBC 0.00 /100 WBC    Neutrophils (Absolute) 3.10 2.00 - 7.15 K/uL    Lymphs (Absolute) 3.14 1.00 - 4.80 K/uL    Monos (Absolute) 0.54 0.00 - 0.85 K/uL    Eos (Absolute) 0.16 0.00 - 0.51 K/uL    Baso (Absolute) 0.02 0.00 - 0.12 K/uL    Immature Granulocytes (abs) 0.02 0.00 - 0.11 K/uL    NRBC (Absolute) 0.00 K/uL   Comp Metabolic Panel   Result Value Ref Range    Sodium 140 135 - 145 mmol/L    Potassium 3.9 3.6 - 5.5 mmol/L    Chloride 110 96 - 112 mmol/L    Co2 20  20 - 33 mmol/L    Anion Gap 10.0 0.0 - 11.9    Glucose 102 (H) 65 - 99 mg/dL    Bun 24 (H) 8 - 22 mg/dL    Creatinine 1.37 0.50 - 1.40 mg/dL    Calcium 9.8 8.5 - 10.5 mg/dL    AST(SGOT) 18 12 - 45 U/L    ALT(SGPT) 9 2 - 50 U/L    Alkaline Phosphatase 64 30 - 99 U/L    Total Bilirubin 0.8 0.1 - 1.5 mg/dL    Albumin 3.7 3.2 - 4.9 g/dL    Total Protein 7.2 6.0 - 8.2 g/dL    Globulin 3.5 1.9 - 3.5 g/dL    A-G Ratio 1.1 g/dL   URINE MICROSCOPIC (W/UA)   Result Value Ref Range    WBC 10-20 (A) /hpf    RBC 10-20 (A) /hpf    Bacteria Few (A) None /hpf    Epithelial Cells Moderate (A) /hpf    Hyaline Cast 6-10 (A) /lpf   ESTIMATED GFR   Result Value Ref Range    GFR If  44 (A) >60 mL/min/1.73 m 2    GFR If Non  36 (A) >60 mL/min/1.73 m 2       All labs reviewed by me.      COURSE & MEDICAL DECISION MAKING  Nursing notes, VS, PMSFHx reviewed in chart.    5:42 PM Patient seen and examined at bedside. Discussed plan of care with patient. I informed them that labs will be ordered to evaluate symptoms. The patient is understanding and agreeable with plan of care. Ordered Estimated GFR, Urine Microscopic, CBC w/ Differential. CMP, UA Culture If Indicated, and UA to evaluate her symptoms. The differential diagnoses include but are not limited to: UTI    6:40 PM Patient was reevaluated at bedside. Discussed lab results with the patient and informed them that there were no acute or abnormal findings. The daughter was informed that the patient's urine was contaminated with MRSA, and a UA or cath UA will be ran to rule out any abnormalities.     7:28 PM The nurse at [facility] was contacting and informed of the patient's contaminated urine. A plan of care was discussed and decided on with the daughter and patient. The patient was informed that she needs to take her antibiotics and be monitored throughout the night. They are both understanding and agreeable to the plan of care. Patient had the  opportunity to ask any questions. The plan for hospitalization was discussed with the patient and her daughter due to the patient's current condition. Both the patient and her daughter are agreeable t the plan for hospitalization.     7:35 PM Patient will be treated with 800-160 MG Bactrim    DISPOSITION:  Discharged home in stable condition    FOLLOW UP:  Denver J Miller, M.D.  [address] [place] [place] [address]  [phone]    Call in 1 day  for recheck      OUTPATIENT MEDICATIONS:  Current Discharge Medication List      START taking these medications    Details   sulfamethoxazole-trimethoprim (BACTRIM DS) 800-160 MG tablet Take 1 Tab by mouth 2 times a day for 7 days.  Qty: 14 Tab, Refills: 0               FINAL IMPRESSION  1. Acute UTI          Christen PEREZ (Quang), am scribing for, and in the presence of, Elisabet Fam M.D..    Electronically signed by: Christen Sousa (Quang), 1/30/2020    Elisabet PEREZ M.D. personally performed the services described in this documentation, as scribed by Christen Sousa in my presence, and it is both accurate and complete. C    The note accurately reflects work and decisions made by me.  Elisabet Fam M.D.  1/30/2020  9:20 PM

## 2020-01-31 NOTE — ED TRIAGE NOTES
"Chief Complaint   Patient presents with   • UTI     Pt [facility] from [facility] living facility for MRSA in urine. Pt. diagnosed with UTI in december, on antibiotics, when retested MRSA positive in urine. Denies urinary symptoms.      Per EMS [facility] not able to keep patient at facility with positive MRSA in urine because patient does not have cathter in place and wanders around facility.     BP (!) 167/96   Pulse 60   Temp 37.2 °C (99 °F) (Oral)   Resp 19   Ht 1.575 m (5' 2\")   Wt 59 kg (130 lb)   SpO2 97%   BMI 23.78 kg/m²     "

## 2020-01-31 NOTE — ED NOTES
Pt ambulated to restroom with slow steady gait. Stand by assist. Family at side. Pt awaiting transport to facility

## 2020-01-31 NOTE — ED NOTES
Med rec complete per pt's MAR from [facility]  Allergies reviewed  Pt finished a 7 day course of Cipro on 1/9/20

## 2020-02-01 NOTE — DISCHARGE PLANNING
Medical Social Work     WYATT received a call from the ERP requesting SW assistance with the pt. The ERP spoke to [facility] the pt assist living facility who advised the ERP that they will not take the pt back due to testing positive for MRSA. WYATT advised the ERP that SW would call [facility] and see if arrangements can be made.     WYATT called [facility] at [phone] and spoke with Kandace the med [facility]. Kandace advised SW that she was advised by her RN that they would not take the pt back until they have a negative culture for MRSA. WYATT explained that the pt is medically clear to discharge home. Kandace advised SW that she would call the RN and see if they would take the pt back. Kandace stated she would have the RN Shamika call SW back at [facility].     WYATT spoke with Shamika and she stated she would take the pt back if they had a transfer report with labs. WYATT advised them that we would send a transfer report but that the MRSA culture would not be resulted till the next day. Shamika stated that would be fine if the pt is not presenting with MRSA symptoms.     WYATT met with the pt and her granddaughter in the pt room and advise them that [facility] advised SW they would take the pt back to there facility. The pt granddaughter Emilie advised SW that she would drive the pt back to [facility].     WYATT received a call from Cyndi who is the Director and she advised SW that she did not like the idea of taking the pt back. WYATT explained the pt is medically clear and that there is not a valid medical reason to admit the pt. Cyndi advised WYATT that she would call Shamika to make a plan.     Shamika called SW back and advised SW that they will be waiting for the pt. Emilie the pt granddaughter received a call from [facility] and [facility] advised her of the plan to take the pt back but advised her that the pt had to be isolated till the test results came back. Emilie made all the arrangements with [facility] in regard to the pt. WYATT provided a transfer report for Emilie  to provided to [facility].     Plan: SW will remain available for pt and family support

## 2020-07-09 ENCOUNTER — HOSPITAL ENCOUNTER (OUTPATIENT)
Facility: MEDICAL CENTER | Age: 85
End: 2020-07-09
Attending: PHYSICIAN ASSISTANT
Payer: MEDICARE

## 2020-07-09 ENCOUNTER — OFFICE VISIT (OUTPATIENT)
Dept: URGENT CARE | Facility: CLINIC | Age: 85
End: 2020-07-09
Payer: MEDICARE

## 2020-07-09 VITALS
RESPIRATION RATE: 18 BRPM | TEMPERATURE: 97.2 F | OXYGEN SATURATION: 97 % | HEART RATE: 75 BPM | SYSTOLIC BLOOD PRESSURE: 126 MMHG | DIASTOLIC BLOOD PRESSURE: 84 MMHG

## 2020-07-09 DIAGNOSIS — N30.90 CYSTITIS WITHOUT HEMATURIA: ICD-10-CM

## 2020-07-09 PROCEDURE — 87077 CULTURE AEROBIC IDENTIFY: CPT

## 2020-07-09 PROCEDURE — 87086 URINE CULTURE/COLONY COUNT: CPT

## 2020-07-09 PROCEDURE — 87186 SC STD MICRODIL/AGAR DIL: CPT

## 2020-07-09 PROCEDURE — 99213 OFFICE O/P EST LOW 20 MIN: CPT | Performed by: PHYSICIAN ASSISTANT

## 2020-07-09 RX ORDER — SULFAMETHOXAZOLE AND TRIMETHOPRIM 800; 160 MG/1; MG/1
1 TABLET ORAL 2 TIMES DAILY
Qty: 20 TAB | Refills: 0 | Status: SHIPPED | OUTPATIENT
Start: 2020-07-09 | End: 2020-07-19

## 2020-07-09 ASSESSMENT — ENCOUNTER SYMPTOMS
SHORTNESS OF BREATH: 0
FEVER: 0
DIARRHEA: 0
VOMITING: 0
SORE THROAT: 0
ABDOMINAL PAIN: 0
NAUSEA: 0
CHILLS: 0
MYALGIAS: 0
CONSTIPATION: 0
EYE PAIN: 0
COUGH: 0
HEADACHES: 0

## 2020-07-10 NOTE — PROGRESS NOTES
Subjective:   Felicity Dominguez is a 89 y.o. female who presents for UTI (uti off and on ,  )      This is a 89-year-old female who presents with her daughter Beatris over concerns about a urinary tract infection.  Apparently the patient has had some behavioral issues at her assisted living center and this is been typical of previous urinary tract infections and so they are hoping for testing and diagnosis.  Patient denies any dysuria frequency and urgency however due to her age and infirmed status she does not have optimal hygiene.  She denies any fevers and states that she has been feeling fine overall.  She had a history of a urinary tract infection back in December which cultured out to be MRSA and they were informed this is highly unlikely to be the urinary tract infection and more likely colonization from contaminated skin sample      Review of Systems   Constitutional: Negative for chills and fever.   HENT: Negative for congestion, ear pain and sore throat.    Eyes: Negative for pain.   Respiratory: Negative for cough and shortness of breath.    Cardiovascular: Negative for chest pain.   Gastrointestinal: Negative for abdominal pain, constipation, diarrhea, nausea and vomiting.   Genitourinary: Negative for dysuria, frequency, hematuria and urgency.   Musculoskeletal: Negative for myalgias.   Skin: Negative for rash.   Neurological: Negative for headaches.       Medications, Allergies, and current problem list reviewed today in Epic.     Objective:     /84 (BP Location: Left arm, Patient Position: Sitting, BP Cuff Size: Adult)   Pulse 75   Temp 36.2 °C (97.2 °F) (Temporal)   Resp 18   SpO2 97%     Physical Exam  Vitals signs reviewed.   Constitutional:       Appearance: Normal appearance.   HENT:      Head: Normocephalic and atraumatic.      Right Ear: External ear normal.      Left Ear: External ear normal.      Nose: Nose normal.      Mouth/Throat:      Mouth: Mucous membranes are moist.   Eyes:       Conjunctiva/sclera: Conjunctivae normal.   Cardiovascular:      Rate and Rhythm: Normal rate.   Pulmonary:      Effort: Pulmonary effort is normal.   Abdominal:      Tenderness: There is no abdominal tenderness. There is no right CVA tenderness or left CVA tenderness.   Skin:     General: Skin is warm and dry.      Capillary Refill: Capillary refill takes less than 2 seconds.   Neurological:      Mental Status: She is alert and oriented to person, place, and time. Mental status is at baseline.       UA shows nitrites and small leuks  Assessment/Plan:     Diagnosis and associated orders:     1. Cystitis without hematuria  sulfamethoxazole-trimethoprim (BACTRIM DS) 800-160 MG tablet    sulfamethoxazole-trimethoprim (BACTRIM DS) 800-160 MG tablet      Comments/MDM:     • We will treat based on the nitrates and leuks which were demonstrated in her urinalysis.  I will also culture her urine and at her daughter's request contact her daughter Beatris ONLY IF medication needs to be changed.  Provide a letter as there is some confusion at the assisted living facility about treatments and we talked about return precautions including those for pyelonephritis.  The patient was well behaving and seemed to be at her baseline for me in clinic with good recall of remote and distant.  • Nikunj abeldest daughter [phone]    Addendum: Pt will be contacted by Devan ROB if necessary.       Differential diagnosis, natural history, supportive care, and indications for immediate follow-up discussed.    Advised the patient to follow-up with the primary care physician for recheck, reevaluation, and consideration of further management.    Please note that this dictation was created using voice recognition software. I have made a reasonable attempt to correct obvious errors, but I expect that there are errors of grammar and possibly content that I did not discover before finalizing the note.    This note was electronically signed by Tru  LIANE Bates

## 2020-07-10 NOTE — PATIENT INSTRUCTIONS

## 2021-06-28 NOTE — LETTER
July 9, 2020    To Whom It May Concern:         This is confirmation that Felicity Dominguez attended her scheduled appointment with Tru Bates P.A.-C. on 7/09/20.  This patient's urinalysis results indicate a urinary tract infection and I have prescribed medication for this condition.  I will be in touch with her daughter regarding the results of further testing.         If you have any questions please do not hesitate to call me at the phone number listed below.    Sincerely,          Tru Bates P.A.-C.  [phone]                 5